# Patient Record
Sex: FEMALE | Race: WHITE | NOT HISPANIC OR LATINO | Employment: OTHER | URBAN - METROPOLITAN AREA
[De-identification: names, ages, dates, MRNs, and addresses within clinical notes are randomized per-mention and may not be internally consistent; named-entity substitution may affect disease eponyms.]

---

## 2023-04-24 ENCOUNTER — OFFICE VISIT (OUTPATIENT)
Dept: PRIMARY CARE | Facility: CLINIC | Age: 63
End: 2023-04-24
Payer: COMMERCIAL

## 2023-04-24 VITALS
HEART RATE: 89 BPM | HEIGHT: 65 IN | WEIGHT: 141.4 LBS | BODY MASS INDEX: 23.56 KG/M2 | DIASTOLIC BLOOD PRESSURE: 70 MMHG | SYSTOLIC BLOOD PRESSURE: 108 MMHG | OXYGEN SATURATION: 97 % | RESPIRATION RATE: 18 BRPM

## 2023-04-24 DIAGNOSIS — L40.9 PSORIASIS: Primary | ICD-10-CM

## 2023-04-24 DIAGNOSIS — G56.03 BILATERAL CARPAL TUNNEL SYNDROME: ICD-10-CM

## 2023-04-24 PROBLEM — M17.0 ARTHRITIS OF BOTH KNEES: Status: RESOLVED | Noted: 2023-04-24 | Resolved: 2023-04-24

## 2023-04-24 PROBLEM — R32 URINARY INCONTINENCE: Status: RESOLVED | Noted: 2023-04-24 | Resolved: 2023-04-24

## 2023-04-24 PROBLEM — R14.0 ABDOMINAL BLOATING: Status: RESOLVED | Noted: 2023-04-24 | Resolved: 2023-04-24

## 2023-04-24 PROBLEM — M25.562 BILATERAL KNEE PAIN: Status: RESOLVED | Noted: 2023-04-24 | Resolved: 2023-04-24

## 2023-04-24 PROBLEM — M25.561 BILATERAL KNEE PAIN: Status: RESOLVED | Noted: 2023-04-24 | Resolved: 2023-04-24

## 2023-04-24 PROBLEM — K21.9 GERD (GASTROESOPHAGEAL REFLUX DISEASE): Status: ACTIVE | Noted: 2023-04-24

## 2023-04-24 PROBLEM — R49.0 HOARSENESS OF VOICE: Status: RESOLVED | Noted: 2023-04-24 | Resolved: 2023-04-24

## 2023-04-24 PROBLEM — Z98.84 BARIATRIC SURGERY STATUS: Status: ACTIVE | Noted: 2023-04-24

## 2023-04-24 PROBLEM — S54.10XA: Status: RESOLVED | Noted: 2023-04-24 | Resolved: 2023-04-24

## 2023-04-24 PROBLEM — E53.8 VITAMIN B12 DEFICIENCY: Status: ACTIVE | Noted: 2023-04-24

## 2023-04-24 PROBLEM — N20.0 NEPHROLITHIASIS: Status: RESOLVED | Noted: 2023-04-24 | Resolved: 2023-04-24

## 2023-04-24 PROBLEM — K22.70 BARRETT'S ESOPHAGUS: Status: ACTIVE | Noted: 2023-04-24

## 2023-04-24 PROBLEM — F41.9 ANXIETY: Status: ACTIVE | Noted: 2023-04-24

## 2023-04-24 PROBLEM — G56.00 CARPAL TUNNEL SYNDROME: Status: ACTIVE | Noted: 2023-04-10

## 2023-04-24 PROBLEM — R10.9 ABDOMINAL PAIN: Status: RESOLVED | Noted: 2023-04-24 | Resolved: 2023-04-24

## 2023-04-24 PROBLEM — J20.9 ACUTE BRONCHITIS: Status: RESOLVED | Noted: 2023-04-24 | Resolved: 2023-04-24

## 2023-04-24 PROBLEM — G43.909 MIGRAINE HEADACHE: Status: ACTIVE | Noted: 2023-04-24

## 2023-04-24 PROBLEM — G47.00 INSOMNIA: Status: ACTIVE | Noted: 2023-04-24

## 2023-04-24 PROBLEM — F32.A DEPRESSION: Status: ACTIVE | Noted: 2023-04-24

## 2023-04-24 PROBLEM — N32.81 OAB (OVERACTIVE BLADDER): Status: ACTIVE | Noted: 2023-04-24

## 2023-04-24 PROBLEM — G25.81 RESTLESS LEGS SYNDROME: Status: ACTIVE | Noted: 2023-04-24

## 2023-04-24 PROBLEM — E56.9 MULTIPLE VITAMIN DEFICIENCY: Status: RESOLVED | Noted: 2023-04-24 | Resolved: 2023-04-24

## 2023-04-24 PROCEDURE — 1036F TOBACCO NON-USER: CPT | Performed by: NURSE PRACTITIONER

## 2023-04-24 PROCEDURE — 99213 OFFICE O/P EST LOW 20 MIN: CPT | Performed by: NURSE PRACTITIONER

## 2023-04-24 RX ORDER — PANTOPRAZOLE SODIUM 40 MG/1
40 TABLET, DELAYED RELEASE ORAL DAILY
COMMUNITY
End: 2023-08-22 | Stop reason: SDUPTHER

## 2023-04-24 RX ORDER — SIMETHICONE 125 MG
TABLET,CHEWABLE ORAL
COMMUNITY

## 2023-04-24 RX ORDER — OXYBUTYNIN CHLORIDE 10 MG/1
1 TABLET, EXTENDED RELEASE ORAL DAILY
COMMUNITY
End: 2023-06-28 | Stop reason: SDUPTHER

## 2023-04-24 RX ORDER — TRAZODONE HYDROCHLORIDE 50 MG/1
50 TABLET ORAL NIGHTLY
COMMUNITY
End: 2024-03-19 | Stop reason: ALTCHOICE

## 2023-04-24 RX ORDER — METHYLPREDNISOLONE 4 MG/1
TABLET ORAL
Qty: 21 TABLET | Refills: 0 | Status: SHIPPED | OUTPATIENT
Start: 2023-04-24 | End: 2023-05-01

## 2023-04-24 RX ORDER — ALBUTEROL SULFATE 90 UG/1
2 AEROSOL, METERED RESPIRATORY (INHALATION) AS NEEDED
COMMUNITY
Start: 2022-06-02

## 2023-04-24 NOTE — PROGRESS NOTES
"Subjective   Patient ID: Lauren Cornell is a 62 y.o. female who presents for Follow-up (Patient in today with concerns of right hand pain starting on 4/10/2023. States she has used OTC meds (with no relief) and a brace which did help some. Patient also stated she is having a Psoriasis flare up. ).    Right wrist pain started 4/10/23 - woke her up out of her sleep.  Hand was numb and tingling and worsened through out the night.   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  She is right hand dominant.    She has tried a brace to wrist and has helped.  She did try OTC medication but did not help.     She has had psoriasis flare up to her right leg and right side of abdomen she has tried lotion but that is not helping.  She denies changes in lotions or creams         Review of Systems   All other systems reviewed and are negative.      Objective   /70   Pulse 89   Resp 18   Ht 1.651 m (5' 5\")   Wt 64.1 kg (141 lb 6.4 oz)   SpO2 97%   BMI 23.53 kg/m²     Physical Exam  Vitals and nursing note reviewed.   Constitutional:       Appearance: Normal appearance. She is normal weight.   HENT:      Head: Normocephalic and atraumatic.      Right Ear: External ear normal.      Left Ear: External ear normal.      Nose: Nose normal.      Mouth/Throat:      Mouth: Mucous membranes are moist.   Cardiovascular:      Rate and Rhythm: Normal rate and regular rhythm.      Pulses: Normal pulses.      Heart sounds: Normal heart sounds.   Pulmonary:      Effort: Pulmonary effort is normal.      Breath sounds: Normal breath sounds.   Musculoskeletal:      Cervical back: Normal range of motion.      Right lower leg: No edema.      Left lower leg: No edema.      Comments: Positive Tinel test bilateral wrists   Skin:     General: Skin is warm.      Comments: Psoriasis noted to right side abdomen and bilateral Calves   Neurological:      Mental Status: She is alert and oriented to person, place, and time. Mental " status is at baseline.   Psychiatric:         Mood and Affect: Mood normal.         Behavior: Behavior normal.         Thought Content: Thought content normal.         Judgment: Judgment normal.         Assessment/Plan   Problem List Items Addressed This Visit          Nervous    Carpal tunnel syndrome     Bilateral positive Tinel test  Right greater than left  She currently does not want referral  Discussed wrist braces at bedtime  She will follow-up if the pain worsens            Immune    Psoriasis - Primary     Current flareup x1 month  Medrol Dosepak

## 2023-04-24 NOTE — PATIENT INSTRUCTIONS
Take the steroids for your psoriasis - if this does not help may need to see a dermatologist    Follow up with GI to discuss your excess gas to see if he has any other suggestion    Wear your wrist brace at bedtime - if the pain does not improve or comes back will have you follow up with orthopedic hand surgeon

## 2023-05-05 DIAGNOSIS — G47.00 INSOMNIA, UNSPECIFIED: ICD-10-CM

## 2023-05-08 RX ORDER — TRAZODONE HYDROCHLORIDE 100 MG/1
TABLET ORAL
Qty: 180 TABLET | Refills: 1 | Status: SHIPPED | OUTPATIENT
Start: 2023-05-08 | End: 2023-08-22 | Stop reason: ALTCHOICE

## 2023-05-14 PROBLEM — L40.9 PSORIASIS: Status: ACTIVE | Noted: 2023-04-24

## 2023-05-14 NOTE — ASSESSMENT & PLAN NOTE
Bilateral positive Tinel test  Right greater than left  She currently does not want referral  Discussed wrist braces at bedtime  She will follow-up if the pain worsens

## 2023-06-28 DIAGNOSIS — N32.81 OVERACTIVE BLADDER: ICD-10-CM

## 2023-06-28 RX ORDER — OXYBUTYNIN CHLORIDE 10 MG/1
10 TABLET, EXTENDED RELEASE ORAL DAILY
Qty: 30 TABLET | Refills: 0 | Status: SHIPPED | OUTPATIENT
Start: 2023-06-28 | End: 2023-07-24

## 2023-07-20 DIAGNOSIS — N32.81 OVERACTIVE BLADDER: ICD-10-CM

## 2023-07-24 RX ORDER — OXYBUTYNIN CHLORIDE 10 MG/1
10 TABLET, EXTENDED RELEASE ORAL DAILY
Qty: 30 TABLET | Refills: 0 | Status: SHIPPED | OUTPATIENT
Start: 2023-07-24 | End: 2023-08-21

## 2023-08-18 DIAGNOSIS — N32.81 OVERACTIVE BLADDER: ICD-10-CM

## 2023-08-21 RX ORDER — OXYBUTYNIN CHLORIDE 10 MG/1
10 TABLET, EXTENDED RELEASE ORAL DAILY
Qty: 30 TABLET | Refills: 0 | Status: SHIPPED | OUTPATIENT
Start: 2023-08-21 | End: 2023-08-22 | Stop reason: SDUPTHER

## 2023-08-22 ENCOUNTER — OFFICE VISIT (OUTPATIENT)
Dept: PRIMARY CARE | Facility: CLINIC | Age: 63
End: 2023-08-22
Payer: COMMERCIAL

## 2023-08-22 VITALS
DIASTOLIC BLOOD PRESSURE: 60 MMHG | BODY MASS INDEX: 23.96 KG/M2 | WEIGHT: 143.8 LBS | OXYGEN SATURATION: 98 % | HEIGHT: 65 IN | HEART RATE: 91 BPM | SYSTOLIC BLOOD PRESSURE: 102 MMHG | RESPIRATION RATE: 18 BRPM

## 2023-08-22 DIAGNOSIS — K22.719 BARRETT'S ESOPHAGUS WITH DYSPLASIA: Primary | ICD-10-CM

## 2023-08-22 DIAGNOSIS — N32.81 OVERACTIVE BLADDER: ICD-10-CM

## 2023-08-22 DIAGNOSIS — N39.46 MIXED STRESS AND URGE URINARY INCONTINENCE: ICD-10-CM

## 2023-08-22 DIAGNOSIS — N32.81 OAB (OVERACTIVE BLADDER): ICD-10-CM

## 2023-08-22 DIAGNOSIS — K21.9 GASTROESOPHAGEAL REFLUX DISEASE, UNSPECIFIED WHETHER ESOPHAGITIS PRESENT: ICD-10-CM

## 2023-08-22 PROBLEM — K29.60 REFLUX GASTRITIS: Status: RESOLVED | Noted: 2023-08-22 | Resolved: 2023-08-22

## 2023-08-22 PROBLEM — G47.33 OBSTRUCTIVE SLEEP APNEA SYNDROME: Status: RESOLVED | Noted: 2023-08-22 | Resolved: 2023-08-22

## 2023-08-22 PROBLEM — K29.60 REFLUX GASTRITIS: Status: ACTIVE | Noted: 2023-08-22

## 2023-08-22 PROBLEM — T42.4X5D: Status: RESOLVED | Noted: 2023-08-22 | Resolved: 2023-08-22

## 2023-08-22 PROBLEM — M79.10 MUSCLE PAIN: Status: RESOLVED | Noted: 2023-08-22 | Resolved: 2023-08-22

## 2023-08-22 PROBLEM — M79.672 PAIN OF LEFT HEEL: Status: RESOLVED | Noted: 2023-08-22 | Resolved: 2023-08-22

## 2023-08-22 PROBLEM — F41.9 ANXIETY: Status: RESOLVED | Noted: 2023-04-24 | Resolved: 2023-08-22

## 2023-08-22 PROBLEM — F32.A DEPRESSION: Status: RESOLVED | Noted: 2023-04-24 | Resolved: 2023-08-22

## 2023-08-22 PROBLEM — Z98.84 BARIATRIC SURGERY STATUS: Status: RESOLVED | Noted: 2023-04-24 | Resolved: 2023-08-22

## 2023-08-22 PROCEDURE — 1036F TOBACCO NON-USER: CPT | Performed by: NURSE PRACTITIONER

## 2023-08-22 PROCEDURE — 99213 OFFICE O/P EST LOW 20 MIN: CPT | Performed by: NURSE PRACTITIONER

## 2023-08-22 RX ORDER — PANTOPRAZOLE SODIUM 40 MG/1
40 TABLET, DELAYED RELEASE ORAL DAILY
Qty: 90 TABLET | Refills: 1 | Status: SHIPPED | OUTPATIENT
Start: 2023-08-22 | End: 2024-03-07

## 2023-08-22 RX ORDER — OXYBUTYNIN CHLORIDE 10 MG/1
10 TABLET, EXTENDED RELEASE ORAL DAILY
Qty: 90 TABLET | Refills: 1 | Status: SHIPPED | OUTPATIENT
Start: 2023-08-22 | End: 2024-03-18

## 2023-08-22 NOTE — PROGRESS NOTES
"Subjective   Patient ID: Lauren Cornell is a 62 y.o. female who presents for Follow-up (Patient in today for routine F/U and medication refills. ).    Presents to follow-up for GERD and Reyes's esophagus.  She does need refill pantoprazole.  She has continued to keep her weight off post gastric sleeve.  She had gastric sleeve 11 years ago.  She will take the trazodone as needed for insomnia she states that she takes at last 3 times a week  Also presents for refill of oxybutynin  She does take it for hyperactive bladder.  She states the oxybutynin does help with her urinary incontinence    She denies complaints today         Review of Systems   All other systems reviewed and are negative.      Objective   /60   Pulse 91   Resp 18   Ht 1.651 m (5' 5\")   Wt 65.2 kg (143 lb 12.8 oz)   SpO2 98%   BMI 23.93 kg/m²     Physical Exam  Vitals and nursing note reviewed.   Constitutional:       General: She is not in acute distress.     Appearance: Normal appearance. She is normal weight.   HENT:      Head: Normocephalic and atraumatic.      Right Ear: External ear normal.      Left Ear: External ear normal.      Nose: Nose normal.      Mouth/Throat:      Mouth: Mucous membranes are moist.   Eyes:      Extraocular Movements: Extraocular movements intact.      Conjunctiva/sclera: Conjunctivae normal.      Pupils: Pupils are equal, round, and reactive to light.   Cardiovascular:      Rate and Rhythm: Normal rate and regular rhythm.      Pulses: Normal pulses.      Heart sounds: Normal heart sounds.   Pulmonary:      Effort: Pulmonary effort is normal.      Breath sounds: Normal breath sounds.   Musculoskeletal:      Cervical back: Normal range of motion.   Skin:     General: Skin is warm.   Neurological:      Mental Status: She is alert and oriented to person, place, and time. Mental status is at baseline.   Psychiatric:         Mood and Affect: Mood normal.         Behavior: Behavior normal.         Thought " Content: Thought content normal.         Judgment: Judgment normal.         Assessment/Plan   Problem List Items Addressed This Visit       Reyes's esophagus - Primary     Stable  History of gastric sleeve approximately 11 years ago  Continue pantoprazole         GERD (gastroesophageal reflux disease)     Stable  History of gastric sleeve approximately 11 years ago  Continue pantoprazole         Relevant Medications    pantoprazole (ProtoNix) 40 mg EC tablet    OAB (overactive bladder)     Stable  Continue oxybutynin  Follow-up in 1 year         Mixed stress and urge urinary incontinence     Stable  Continue oxybutynin  Follow-up in 1 year          Other Visit Diagnoses       Overactive bladder        Relevant Medications    oxybutynin XL (Ditropan-XL) 10 mg 24 hr tablet

## 2023-08-31 ENCOUNTER — DOCUMENTATION (OUTPATIENT)
Dept: PRIMARY CARE | Facility: CLINIC | Age: 63
End: 2023-08-31
Payer: COMMERCIAL

## 2023-08-31 DIAGNOSIS — K21.9 GASTROESOPHAGEAL REFLUX DISEASE WITHOUT ESOPHAGITIS: Primary | ICD-10-CM

## 2023-10-05 ENCOUNTER — APPOINTMENT (OUTPATIENT)
Dept: ORTHOPEDIC SURGERY | Facility: CLINIC | Age: 63
End: 2023-10-05
Payer: COMMERCIAL

## 2023-10-11 ENCOUNTER — OFFICE VISIT (OUTPATIENT)
Dept: ORTHOPEDIC SURGERY | Facility: CLINIC | Age: 63
End: 2023-10-11
Payer: COMMERCIAL

## 2023-10-11 DIAGNOSIS — M17.11 PATELLOFEMORAL ARTHRITIS OF RIGHT KNEE: Primary | ICD-10-CM

## 2023-10-11 DIAGNOSIS — M25.561 RIGHT KNEE PAIN, UNSPECIFIED CHRONICITY: ICD-10-CM

## 2023-10-11 PROCEDURE — 99214 OFFICE O/P EST MOD 30 MIN: CPT | Performed by: ORTHOPAEDIC SURGERY

## 2023-10-11 PROCEDURE — 1036F TOBACCO NON-USER: CPT | Performed by: ORTHOPAEDIC SURGERY

## 2023-10-12 NOTE — PROGRESS NOTES
Right knee pain    62-year-old female presents to see me in regards to right knee pain.  Patient is at 5 years of pain primarily over the anterior aspect of the knee.  She states that stairs are the worst making the pain worse.  She has had extensive conservative treatment with anti-inflammatories physical therapy steroid injections and viscosupplementation injections with only limited improvement.  She is here to review the results of an MRI.      Patients' self reported past medical history, medications, allergies, surgical history, family and social history as well as a 10 point review of systems has been documented in the new patient intake form and scanned into the patient's electronic medical record.  The intake form was reviewed by Dr Singh during the office visit and signed by Dr. Singh and the patient.  Pertinent findings are documented in the HPI.    General Multi-System Physical Exam:  Constitutional  General appearance:  Alert, oriented, and in no acute distress.  Well developed, well nourished.  Head and Face  Head and face:  Normocephalic and atraumatic.  Ears, Nose, Mouth, and Throat  External inspection of ears and nose: Normal.  Eyes:  Pupils are equal and round.  Neck  Neck:  no neck mass was observed.  Pulmonary  Respiratory effort:  no respiratory distress.  Cardiovascular  Intact distal pulses.  Lymphatic  Palpation of lymph nodes in the affected extremity:  Normal.  Skin  Skin and subcutaneous tissue:  Normal skin color and pigmentation.  Normal skin turgor.  No rashes.  Neurologic  Reflexes:  deep tendon reflexes were 2+ and symmetric.  Sensation:  normal to light touch.  Psychiatric  Judgement and insight:  Intact.  Mood and affect:  Normal.  Musculoskeletal  Right knee has full range of motion with a minimal effusion positive patellar grind.  Positive medial joint line tenderness negative lateral joint line tenderness.  Patient has a negative Lockman exam, negative anterior and negative  posterior drawer. The knee is stable to varus and valgus stress without pain. Patient is neurovascularly intact in the bilateral lower extremities.      Dr Singh personally reviewed the results of the x-rays that had been performed recently on this patient.    In addition, Dr Singh independently interpreted the patient's x-rays (performed by the Radiology department) by viewing the x-ray images and this is Dr. Singh's personal interpretation:     Some patellofemoral cartilage wear but no other acute findings noted    Dr Singh independently interpreted the patient's MRI (performed by the Radiology department) by viewing the MRI images and this is Dr. Singh's personal interpretation:     Severe patellofemoral cartilage damage with no medial or lateral meniscus tearing    We had a very long discussion regards to her right knee.  We talked about the fact that the pain is coming from the cartilage damage underneath her patellofemoral joint.  There is really no significant arthroscopic treatment that could help this.  We talked about the fact that this is arthritis.  Unfortunately, there is very little I can do to help this patient.  I will refer her back to Dr. Birmingham for further treatment of injections versus possible partial or total knee replacement.        This patient has had longstanding pain and weakness in their affected extremity which has gotten worse.  Non-operative treatment has failed to help this patient and the pain is worsening.  That would classify this problem as a chronic illness with exacerbation and progression.    Due to this patient's condition, they are at a moderate risk of morbidity from additional diagnostic testing / treatment.

## 2023-12-28 ENCOUNTER — LAB (OUTPATIENT)
Dept: LAB | Facility: LAB | Age: 63
End: 2023-12-28
Payer: COMMERCIAL

## 2023-12-28 DIAGNOSIS — K21.9 GASTROESOPHAGEAL REFLUX DISEASE WITHOUT ESOPHAGITIS: ICD-10-CM

## 2023-12-28 PROCEDURE — 36415 COLL VENOUS BLD VENIPUNCTURE: CPT

## 2023-12-28 PROCEDURE — 82306 VITAMIN D 25 HYDROXY: CPT

## 2023-12-28 PROCEDURE — 82607 VITAMIN B-12: CPT

## 2023-12-29 LAB
25(OH)D3 SERPL-MCNC: 33 NG/ML (ref 30–100)
VIT B12 SERPL-MCNC: 264 PG/ML (ref 211–911)

## 2024-02-19 DIAGNOSIS — Z80.3 FAMILY HISTORY OF BREAST CANCER IN SISTER: Primary | ICD-10-CM

## 2024-02-19 NOTE — PROGRESS NOTES
Would like referral to genetics to be tested again for BRCA. Was tested in 2010 and negative but has had 2 sisters pass from breast cancer.  Referral placed

## 2024-03-07 DIAGNOSIS — K21.9 GASTROESOPHAGEAL REFLUX DISEASE, UNSPECIFIED WHETHER ESOPHAGITIS PRESENT: ICD-10-CM

## 2024-03-07 RX ORDER — PANTOPRAZOLE SODIUM 40 MG/1
40 TABLET, DELAYED RELEASE ORAL DAILY
Qty: 90 TABLET | Refills: 1 | Status: SHIPPED | OUTPATIENT
Start: 2024-03-07

## 2024-03-18 DIAGNOSIS — N32.81 OVERACTIVE BLADDER: ICD-10-CM

## 2024-03-18 RX ORDER — OXYBUTYNIN CHLORIDE 10 MG/1
10 TABLET, EXTENDED RELEASE ORAL DAILY
Qty: 90 TABLET | Refills: 1 | Status: SHIPPED | OUTPATIENT
Start: 2024-03-18 | End: 2024-06-06 | Stop reason: SDUPTHER

## 2024-03-19 ENCOUNTER — LAB (OUTPATIENT)
Dept: LAB | Facility: LAB | Age: 64
End: 2024-03-19
Payer: COMMERCIAL

## 2024-03-19 ENCOUNTER — OFFICE VISIT (OUTPATIENT)
Dept: PRIMARY CARE | Facility: CLINIC | Age: 64
End: 2024-03-19
Payer: COMMERCIAL

## 2024-03-19 VITALS
WEIGHT: 142 LBS | OXYGEN SATURATION: 98 % | RESPIRATION RATE: 18 BRPM | HEIGHT: 65 IN | SYSTOLIC BLOOD PRESSURE: 108 MMHG | BODY MASS INDEX: 23.66 KG/M2 | DIASTOLIC BLOOD PRESSURE: 74 MMHG | HEART RATE: 91 BPM

## 2024-03-19 DIAGNOSIS — Z13.6 SCREENING FOR CARDIOVASCULAR CONDITION: ICD-10-CM

## 2024-03-19 DIAGNOSIS — G47.09 OTHER INSOMNIA: Primary | ICD-10-CM

## 2024-03-19 DIAGNOSIS — G56.03 BILATERAL CARPAL TUNNEL SYNDROME: ICD-10-CM

## 2024-03-19 DIAGNOSIS — Z12.4 CERVICAL CANCER SCREENING: ICD-10-CM

## 2024-03-19 DIAGNOSIS — Z00.00 HEALTH CARE MAINTENANCE: ICD-10-CM

## 2024-03-19 DIAGNOSIS — Z12.31 SCREENING MAMMOGRAM, ENCOUNTER FOR: ICD-10-CM

## 2024-03-19 DIAGNOSIS — E89.40 ASYMPTOMATIC POSTSURGICAL MENOPAUSE: ICD-10-CM

## 2024-03-19 LAB
ALBUMIN SERPL BCP-MCNC: 4.2 G/DL (ref 3.4–5)
ALP SERPL-CCNC: 70 U/L (ref 33–136)
ALT SERPL W P-5'-P-CCNC: 10 U/L (ref 7–45)
ANION GAP SERPL CALC-SCNC: 12 MMOL/L (ref 10–20)
AST SERPL W P-5'-P-CCNC: 12 U/L (ref 9–39)
BILIRUB SERPL-MCNC: 0.9 MG/DL (ref 0–1.2)
BUN SERPL-MCNC: 23 MG/DL (ref 6–23)
CALCIUM SERPL-MCNC: 9.2 MG/DL (ref 8.6–10.3)
CHLORIDE SERPL-SCNC: 102 MMOL/L (ref 98–107)
CHOLEST SERPL-MCNC: 186 MG/DL (ref 0–199)
CHOLESTEROL/HDL RATIO: 3.3
CO2 SERPL-SCNC: 30 MMOL/L (ref 21–32)
CREAT SERPL-MCNC: 0.76 MG/DL (ref 0.5–1.05)
EGFRCR SERPLBLD CKD-EPI 2021: 88 ML/MIN/1.73M*2
ERYTHROCYTE [DISTWIDTH] IN BLOOD BY AUTOMATED COUNT: 13 % (ref 11.5–14.5)
GLUCOSE SERPL-MCNC: 98 MG/DL (ref 74–99)
HCT VFR BLD AUTO: 46.4 % (ref 36–46)
HDLC SERPL-MCNC: 56.1 MG/DL
HGB BLD-MCNC: 14.3 G/DL (ref 12–16)
LDLC SERPL CALC-MCNC: 114 MG/DL
MCH RBC QN AUTO: 27.3 PG (ref 26–34)
MCHC RBC AUTO-ENTMCNC: 30.8 G/DL (ref 32–36)
MCV RBC AUTO: 89 FL (ref 80–100)
NON HDL CHOLESTEROL: 130 MG/DL (ref 0–149)
NRBC BLD-RTO: 0 /100 WBCS (ref 0–0)
PLATELET # BLD AUTO: 280 X10*3/UL (ref 150–450)
POTASSIUM SERPL-SCNC: 3.9 MMOL/L (ref 3.5–5.3)
PROT SERPL-MCNC: 6.7 G/DL (ref 6.4–8.2)
RBC # BLD AUTO: 5.23 X10*6/UL (ref 4–5.2)
SODIUM SERPL-SCNC: 140 MMOL/L (ref 136–145)
TRIGL SERPL-MCNC: 79 MG/DL (ref 0–149)
TSH SERPL-ACNC: 1.42 MIU/L (ref 0.44–3.98)
VLDL: 16 MG/DL (ref 0–40)
WBC # BLD AUTO: 4.7 X10*3/UL (ref 4.4–11.3)

## 2024-03-19 PROCEDURE — 88142 CYTOPATH C/V THIN LAYER: CPT

## 2024-03-19 PROCEDURE — 99396 PREV VISIT EST AGE 40-64: CPT | Performed by: NURSE PRACTITIONER

## 2024-03-19 PROCEDURE — 36415 COLL VENOUS BLD VENIPUNCTURE: CPT

## 2024-03-19 PROCEDURE — 88141 CYTOPATH C/V INTERPRET: CPT | Performed by: PATHOLOGY

## 2024-03-19 PROCEDURE — 84443 ASSAY THYROID STIM HORMONE: CPT

## 2024-03-19 PROCEDURE — 87624 HPV HI-RISK TYP POOLED RSLT: CPT

## 2024-03-19 PROCEDURE — 1036F TOBACCO NON-USER: CPT | Performed by: NURSE PRACTITIONER

## 2024-03-19 PROCEDURE — 85027 COMPLETE CBC AUTOMATED: CPT

## 2024-03-19 PROCEDURE — 80061 LIPID PANEL: CPT

## 2024-03-19 PROCEDURE — 80053 COMPREHEN METABOLIC PANEL: CPT

## 2024-03-19 RX ORDER — HYDROXYZINE HYDROCHLORIDE 10 MG/1
10 TABLET, FILM COATED ORAL NIGHTLY PRN
Qty: 30 TABLET | Refills: 3 | Status: SHIPPED | OUTPATIENT
Start: 2024-03-19 | End: 2024-06-06 | Stop reason: SDUPTHER

## 2024-03-19 NOTE — ASSESSMENT & PLAN NOTE
Trazodone makes her feel groggy in the morning  Stop trazodone  Start hydroxyzine 10 mg at bedtime

## 2024-03-19 NOTE — ASSESSMENT & PLAN NOTE
- CBC, CMP, Lipid  -  up to date with immunizations  -  Pap today  -  Mammogram ordered  -  screening colonoscopy 2022  -  eat a diet high in lean protein, vegetable, fruits, and low in carbohydrates  -  exercise 20-30 minutes 4-5 days a week  -  Follow up in 1 year or sooner if needed   - dexa ordered due to hx of gastric sleeve 13 years ago

## 2024-03-19 NOTE — PROGRESS NOTES
"Subjective   Patient ID: Lauren Cornell is a 63 y.o. female who presents for Annual Exam (Lauren is in today for annual CPE. She would like to discuss left hand pain/weakness, weight, and difficulty with sleeping. ).    Well Adult Physical   Patient here for a comprehensive physical exam.      Do you take any herbs or supplements that were not prescribed by a doctor? no   Are you taking calcium supplements? no   Are you taking aspirin daily? no     History:  Had uterine ablation at age 50 and has not period since  Last pap date:   Abnormal pap? no  : 5  Para: 3    Mammogram   Pap 2018  Dexa has not had  Colonoscopy     Still having trouble falling and staying asleep. Feels groggy the next day when she takes trazodone  She has had the carpel tunnel again - worse to left side than right.  Has not been wearing wrist braces at night  Working    Does not smoke            Review of Systems   All other systems reviewed and are negative.      Objective   /74   Pulse 91   Resp 18   Ht 1.651 m (5' 5\")   Wt 64.4 kg (142 lb)   SpO2 98%   BMI 23.63 kg/m²     Physical Exam  Vitals and nursing note reviewed.   Constitutional:       General: She is not in acute distress.     Appearance: Normal appearance. She is normal weight.   HENT:      Head: Normocephalic and atraumatic.      Right Ear: Tympanic membrane, ear canal and external ear normal.      Left Ear: Tympanic membrane, ear canal and external ear normal.      Nose: Nose normal.      Mouth/Throat:      Mouth: Mucous membranes are moist.      Pharynx: Oropharynx is clear.   Eyes:      Extraocular Movements: Extraocular movements intact.      Conjunctiva/sclera: Conjunctivae normal.      Pupils: Pupils are equal, round, and reactive to light.   Neck:      Vascular: No carotid bruit.   Cardiovascular:      Rate and Rhythm: Normal rate and regular rhythm.      Pulses: Normal pulses.      Heart sounds: Normal heart sounds.   Pulmonary: "      Effort: Pulmonary effort is normal.      Breath sounds: Normal breath sounds.   Abdominal:      General: Bowel sounds are normal. There is no distension.      Palpations: Abdomen is soft.      Tenderness: There is no abdominal tenderness.   Genitourinary:     General: Normal vulva.      Vagina: No vaginal discharge.   Musculoskeletal:         General: Normal range of motion.      Cervical back: Normal range of motion and neck supple.      Right lower leg: No edema.      Left lower leg: No edema.   Lymphadenopathy:      Cervical: No cervical adenopathy.   Skin:     General: Skin is warm and dry.      Capillary Refill: Capillary refill takes less than 2 seconds.   Neurological:      General: No focal deficit present.      Mental Status: She is alert and oriented to person, place, and time. Mental status is at baseline.      Motor: No weakness.   Psychiatric:         Mood and Affect: Mood normal.         Behavior: Behavior normal.         Thought Content: Thought content normal.         Judgment: Judgment normal.         Assessment/Plan   Problem List Items Addressed This Visit             ICD-10-CM    Carpal tunnel syndrome G56.00     Wear the brace at bedtime to see if helps  If not let me know and will try steroids  And if that doesn't help will send you to orthopedics         Insomnia - Primary G47.00     Trazodone makes her feel groggy in the morning  Stop trazodone  Start hydroxyzine 10 mg at bedtime         Relevant Medications    hydrOXYzine HCL (Atarax) 10 mg tablet    Health care maintenance Z00.00     - CBC, CMP, Lipid  -  up to date with immunizations  -  Pap today  -  Mammogram ordered  -  screening colonoscopy 2022  -  eat a diet high in lean protein, vegetable, fruits, and low in carbohydrates  -  exercise 20-30 minutes 4-5 days a week  -  Follow up in 1 year or sooner if needed   - dexa ordered due to hx of gastric sleeve 13 years ago         Relevant Orders    CBC    Comprehensive Metabolic Panel     TSH with reflex to Free T4 if abnormal     Other Visit Diagnoses         Codes    Screening for cardiovascular condition     Z13.6    Relevant Orders    Lipid Panel    Screening mammogram, encounter for     Z12.31    Relevant Orders    BI mammo bilateral screening tomosynthesis    Cervical cancer screening     Z12.4    Relevant Orders    THINPREP PAP TEST    Asymptomatic postsurgical menopause     E89.40    Relevant Orders    XR DEXA bone density

## 2024-03-19 NOTE — ASSESSMENT & PLAN NOTE
Wear the brace at bedtime to see if helps  If not let me know and will try steroids  And if that doesn't help will send you to orthopedics

## 2024-03-19 NOTE — PATIENT INSTRUCTIONS
Stop the trazodone  Start the hydroxyzine at bedtime to help with feeling anxious and sleeping  I did your pap today - will let you know the results  I ordered a bone density test  I ordered your mammogram  Your colonoscopy was in 2022

## 2024-03-22 ENCOUNTER — HOSPITAL ENCOUNTER (OUTPATIENT)
Dept: RADIOLOGY | Facility: HOSPITAL | Age: 64
Discharge: HOME | End: 2024-03-22
Payer: COMMERCIAL

## 2024-03-22 DIAGNOSIS — E89.40 ASYMPTOMATIC POSTSURGICAL MENOPAUSE: ICD-10-CM

## 2024-03-25 ENCOUNTER — TELEPHONE (OUTPATIENT)
Dept: PRIMARY CARE | Facility: CLINIC | Age: 64
End: 2024-03-25
Payer: COMMERCIAL

## 2024-03-26 ENCOUNTER — HOSPITAL ENCOUNTER (OUTPATIENT)
Dept: RADIOLOGY | Facility: HOSPITAL | Age: 64
Discharge: HOME | End: 2024-03-26
Payer: COMMERCIAL

## 2024-03-26 VITALS — WEIGHT: 143 LBS | HEIGHT: 65 IN | BODY MASS INDEX: 23.82 KG/M2

## 2024-03-26 DIAGNOSIS — Z12.31 SCREENING MAMMOGRAM, ENCOUNTER FOR: ICD-10-CM

## 2024-03-26 PROCEDURE — 77067 SCR MAMMO BI INCL CAD: CPT | Performed by: RADIOLOGY

## 2024-03-26 PROCEDURE — 77063 BREAST TOMOSYNTHESIS BI: CPT | Performed by: RADIOLOGY

## 2024-03-26 PROCEDURE — 77067 SCR MAMMO BI INCL CAD: CPT

## 2024-03-26 NOTE — TELEPHONE ENCOUNTER
Called patient no answer left a detailed VM with this information. Patient to CB with questions or concerns.

## 2024-03-28 ENCOUNTER — HOSPITAL ENCOUNTER (OUTPATIENT)
Dept: RADIOLOGY | Facility: HOSPITAL | Age: 64
Discharge: HOME | End: 2024-03-28
Payer: COMMERCIAL

## 2024-03-28 PROCEDURE — 77080 DXA BONE DENSITY AXIAL: CPT | Performed by: RADIOLOGY

## 2024-03-28 PROCEDURE — 77080 DXA BONE DENSITY AXIAL: CPT

## 2024-03-29 LAB
CYTOLOGY CMNT CVX/VAG CYTO-IMP: NORMAL
HPV HR 12 DNA GENITAL QL NAA+PROBE: POSITIVE
HPV HR GENOTYPES PNL CVX NAA+PROBE: POSITIVE
HPV16 DNA SPEC QL NAA+PROBE: POSITIVE
HPV18 DNA SPEC QL NAA+PROBE: NEGATIVE
LAB AP HPV GENOTYPE QUESTION: YES
LAB AP HPV HR: NORMAL
LABORATORY COMMENT REPORT: NORMAL
LABORATORY COMMENT REPORT: NORMAL
PATH REPORT.TOTAL CANCER: NORMAL

## 2024-04-01 DIAGNOSIS — R87.618 ABNORMAL PAPANICOLAOU SMEAR OF CERVIX WITH POSITIVE HUMAN PAPILLOMA VIRUS (HPV) TEST: Primary | ICD-10-CM

## 2024-04-08 ENCOUNTER — APPOINTMENT (OUTPATIENT)
Dept: PRIMARY CARE | Facility: CLINIC | Age: 64
End: 2024-04-08
Payer: COMMERCIAL

## 2024-04-24 PROCEDURE — 88305 TISSUE EXAM BY PATHOLOGIST: CPT | Performed by: PATHOLOGY

## 2024-04-24 PROCEDURE — 88305 TISSUE EXAM BY PATHOLOGIST: CPT

## 2024-04-25 ENCOUNTER — LAB REQUISITION (OUTPATIENT)
Dept: LAB | Facility: HOSPITAL | Age: 64
End: 2024-04-25
Payer: COMMERCIAL

## 2024-04-25 DIAGNOSIS — R87.613 HIGH GRADE SQUAMOUS INTRAEPITHELIAL LESION ON CYTOLOGIC SMEAR OF CERVIX (HGSIL): ICD-10-CM

## 2024-04-29 LAB
LABORATORY COMMENT REPORT: NORMAL
PATH REPORT.FINAL DX SPEC: NORMAL
PATH REPORT.GROSS SPEC: NORMAL
PATH REPORT.RELEVANT HX SPEC: NORMAL
PATH REPORT.TOTAL CANCER: NORMAL

## 2024-05-29 ENCOUNTER — APPOINTMENT (OUTPATIENT)
Dept: GENETICS | Facility: CLINIC | Age: 64
End: 2024-05-29
Payer: COMMERCIAL

## 2024-05-29 PROCEDURE — 88305 TISSUE EXAM BY PATHOLOGIST: CPT

## 2024-05-29 PROCEDURE — 88341 IMHCHEM/IMCYTCHM EA ADD ANTB: CPT

## 2024-05-29 PROCEDURE — 88342 IMHCHEM/IMCYTCHM 1ST ANTB: CPT

## 2024-05-30 ENCOUNTER — LAB REQUISITION (OUTPATIENT)
Dept: LAB | Facility: HOSPITAL | Age: 64
End: 2024-05-30
Payer: COMMERCIAL

## 2024-05-30 DIAGNOSIS — R87.810 CERVICAL HIGH RISK HUMAN PAPILLOMAVIRUS (HPV) DNA TEST POSITIVE: ICD-10-CM

## 2024-05-30 DIAGNOSIS — R87.613 HIGH GRADE SQUAMOUS INTRAEPITHELIAL LESION ON CYTOLOGIC SMEAR OF CERVIX (HGSIL): ICD-10-CM

## 2024-06-06 ENCOUNTER — OFFICE VISIT (OUTPATIENT)
Dept: PRIMARY CARE | Facility: CLINIC | Age: 64
End: 2024-06-06
Payer: COMMERCIAL

## 2024-06-06 VITALS
BODY MASS INDEX: 23.14 KG/M2 | SYSTOLIC BLOOD PRESSURE: 129 MMHG | HEIGHT: 66 IN | WEIGHT: 144 LBS | DIASTOLIC BLOOD PRESSURE: 82 MMHG

## 2024-06-06 DIAGNOSIS — G47.09 OTHER INSOMNIA: ICD-10-CM

## 2024-06-06 DIAGNOSIS — N32.81 OVERACTIVE BLADDER: ICD-10-CM

## 2024-06-06 DIAGNOSIS — N32.81 OAB (OVERACTIVE BLADDER): Primary | ICD-10-CM

## 2024-06-06 PROCEDURE — 99213 OFFICE O/P EST LOW 20 MIN: CPT | Performed by: NURSE PRACTITIONER

## 2024-06-06 PROCEDURE — 1036F TOBACCO NON-USER: CPT | Performed by: NURSE PRACTITIONER

## 2024-06-06 RX ORDER — OXYBUTYNIN CHLORIDE 15 MG/1
15 TABLET, EXTENDED RELEASE ORAL DAILY
Qty: 30 TABLET | Refills: 11 | Status: SHIPPED | OUTPATIENT
Start: 2024-06-06

## 2024-06-06 RX ORDER — HYDROXYZINE HYDROCHLORIDE 25 MG/1
25 TABLET, FILM COATED ORAL NIGHTLY PRN
Qty: 30 TABLET | Refills: 3 | Status: SHIPPED | OUTPATIENT
Start: 2024-06-06 | End: 2024-10-04

## 2024-06-06 ASSESSMENT — PATIENT HEALTH QUESTIONNAIRE - PHQ9
SUM OF ALL RESPONSES TO PHQ9 QUESTIONS 1 AND 2: 0
1. LITTLE INTEREST OR PLEASURE IN DOING THINGS: NOT AT ALL
2. FEELING DOWN, DEPRESSED OR HOPELESS: NOT AT ALL

## 2024-06-06 NOTE — PROGRESS NOTES
"Subjective   Patient ID: Dejah Cornell is a 63 y.o. female who presents for Results (Would like to discuss test results, would like to up the dose on Atarax and Albuterol ).    Presents to follow up for anxiety.  Would like to increase hydroxyzine at night.  Still having trouble falling asleep.  She did try 2 tabs and that did help.  Would like to increase    Would like to try to increase ditropan to see if it will help.  She still has urinary frequency     Did have to have repeat uterine biopsy as first one was not sufficient.  She is waiting for results          Review of Systems   All other systems reviewed and are negative.      Objective   /82   Ht 1.676 m (5' 6\")   Wt 65.3 kg (144 lb)   BMI 23.24 kg/m²     Physical Exam  Vitals and nursing note reviewed.   Constitutional:       General: She is not in acute distress.     Appearance: Normal appearance. She is normal weight.   HENT:      Head: Normocephalic and atraumatic.      Right Ear: External ear normal.      Left Ear: External ear normal.      Nose: Nose normal.      Mouth/Throat:      Mouth: Mucous membranes are moist.      Pharynx: Oropharynx is clear.   Eyes:      Extraocular Movements: Extraocular movements intact.      Conjunctiva/sclera: Conjunctivae normal.      Pupils: Pupils are equal, round, and reactive to light.   Neck:      Vascular: No carotid bruit.   Cardiovascular:      Rate and Rhythm: Normal rate and regular rhythm.      Pulses: Normal pulses.      Heart sounds: Normal heart sounds.   Pulmonary:      Effort: Pulmonary effort is normal.      Breath sounds: Normal breath sounds.   Musculoskeletal:      Cervical back: Normal range of motion and neck supple.   Lymphadenopathy:      Cervical: No cervical adenopathy.   Skin:     General: Skin is warm and dry.      Capillary Refill: Capillary refill takes less than 2 seconds.   Neurological:      Mental Status: She is alert and oriented to person, place, and time.   Psychiatric:    "      Mood and Affect: Mood normal.         Behavior: Behavior normal.         Thought Content: Thought content normal.         Judgment: Judgment normal.         Assessment/Plan   Problem List Items Addressed This Visit             ICD-10-CM    Insomnia G47.00     Improved with 20 mg  Increase hydroxyzine to 25 mg at night         Relevant Medications    hydrOXYzine HCL (Atarax) 25 mg tablet    OAB (overactive bladder) - Primary N32.81     Increase oxybutynin to 15 mg  Follow up in one year          Other Visit Diagnoses         Codes    Overactive bladder     N32.81    Relevant Medications    oxybutynin XL (Ditropan-XL) 15 mg 24 hr tablet               Patient was identified as a fall risk. Risk prevention instructions provided.

## 2024-06-06 NOTE — PATIENT INSTRUCTIONS
I sent in a higher dose of the hydroxyzine to help you sleep  I sent in a higher dose of the ditropan - this is the highest dose of medication        Ways to Help Prevent Falls at Home    Quick Tips   ? Ask for help if you need it. Most people want to help!   ? Get up slowly after sitting or laying down   ? Wear a medical alert device or keep cell phone in your pocket   ? Use night lights, especially areas near a bathroom   ? Keep the items you use often within reach on a small stool or end table   ? Use an assistive device such as walker or cane, as directed by provider/physical therapy   ? Use a non-slip mat and grab bars in your bathroom. Look for home health sections for best options     Other Areas to Focus On   ? Exercise and nutrition: Regular exercise or taking a falls prevention class are great ways improve strength and balance. Don’t forget to stay hydrated and bring a snack!   ? Medicine side effects: Some medicines can make you sleepy or dizzy, which could cause a fall. Ask your healthcare provider about the side effects your medicines could cause. Be sure to let them know if you take any vitamins or supplements as well.   ? Tripping hazards: Remove items you could trip on, such as loose mats, rugs, cords, and clutter. Wear closed toe shoes with rubber soles.   ? Health and wellness: Get regular checkups with your healthcare provider, plus routine vision and hearing screenings. Talk with your healthcare provider about:   o Your medicines and the possible side effects - bring them in a bag if that is easier!   o Problems with balance or feeling dizzy   o Ways to promote bone health, such as Vitamin D and calcium supplements   o Questions or concerns about falling     *Ask your healthcare team if you have questions     ©[employer], 2022

## 2024-06-12 LAB
LAB AP ASR DISCLAIMER: NORMAL
LABORATORY COMMENT REPORT: NORMAL
PATH REPORT.FINAL DX SPEC: NORMAL
PATH REPORT.GROSS SPEC: NORMAL
PATH REPORT.RELEVANT HX SPEC: NORMAL
PATH REPORT.TOTAL CANCER: NORMAL

## 2024-06-18 ENCOUNTER — HOSPITAL ENCOUNTER (OUTPATIENT)
Dept: RADIOLOGY | Facility: EXTERNAL LOCATION | Age: 64
Discharge: HOME | End: 2024-06-18

## 2024-06-18 DIAGNOSIS — R52 PAIN: ICD-10-CM

## 2024-07-10 ENCOUNTER — APPOINTMENT (OUTPATIENT)
Dept: GENETICS | Facility: CLINIC | Age: 64
End: 2024-07-10
Payer: COMMERCIAL

## 2024-07-10 DIAGNOSIS — Z80.3 FAMILY HISTORY OF BREAST CANCER IN SISTER: Primary | ICD-10-CM

## 2024-07-10 PROCEDURE — 99205 OFFICE O/P NEW HI 60 MIN: CPT | Performed by: INTERNAL MEDICINE

## 2024-07-10 NOTE — PROGRESS NOTES
"  MEDICAL GENETICS INITIAL VISIT NOTE      Patient full name: Lauren Cornell  MRN: [id number]  YOB: 1960  Present during this visit: The patient     Primary care and referring provider: IVONNE Dowell    Medical history was obtained from the patient. Prior to this appointment, I reviewed medical records from outpatient medical records and scanned documents, if available. This visit was completed via televideo. All issues as below were discussed and addressed but no physical exam was performed. If it was felt that the patient should be evaluated in clinic then they were directed there. The patient consented to visit.    History of present illness:    Dear IVONNE Dowell:    It was a pleasure to see Ms. Cornell in clinic today. Ms. Cornell is a 63 y.o. female who was referred to the Medical Genetics Clinic at [facility] for evaluation of hereditary predisposition to cancer.     She informed me that she was recently diagnosed with \"cervical cancer\". Chart review showed that recent cervix biopsy showed HSIL. A recent pap smear showed positive HPV. She is scheduled to have hysterectomy on 2024. No other cancer in her lifetime.     In , she had negative genetic testing for \"BRCA\".    PMH - GERD, RLS, migraines, psoriasis    Cancer screening history:  Abnormal mammogram: No   has had MRI long time ago. Dense breast tissues were reported.   Colonoscopy: Yes   Precancerous polyps: denies   Upper endoscopy: No   Precancerous skin lesions or moles that are being followed by Derm: has moles, has seen Derm.      Reproductive History:  , 2 pregnancy losses  Age first birth: 20  Breast feeding? 3y in total  Menarche (age): [age]  Menopause (age): after uterine ablation at 49 y/o due to bleeding  OCP: a few years, long time ago  HRT: No   Hysterectomy? No   Oophorectomy? 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 " "    Family history:  A six-generation family tree was obtained and scanned to chart.  Pertinent history   Brother (d) \"spider vein brain cancer\"  Brother (d) metastatic melanoma  Brother (d) lung/bone cancer, smoked  Sister (d) metastatic breast cancer at 68, unilateral  Sister (d) metastatic breast cancer at 67, s/p bilateral mastectomy. Uncertain whether she had bilateral breast cancer.   Sister, cervical cancer  Father (d) lung/bone cancer, smoked.     Ashkenazi Uatsdin: Denied    Physical examination:  No physical exam was performed. This is a virtual visit.     Assessment:  Ms. Cornell is a 63 y.o. female with personal h/o HSIL. Family history is notable for late-onset breast cancer in two sisters, cervical cancer in sister, brain cancer in brother, lung/bone cancer in brother and father, and metastatic melanoma in brother. In 2010, she had negative genetic testing for \"BRCA\".    Breast cancer is common with a lifetime risk of 12% in females in general population. We discussed genetics of breast cancer. Most of the cases are multifactorial (polygenic and environmental) in origin, while 5%-10% are caused by single gene mutations. Examples of red flags that raise suspicion for monogenic etiology include early age at onset, bilateral breast cancer, male breast cancer, personal and/or family history of other cancers, Ashkenazi U[place] ancestry, and triple negative breast cancer. Identification of individuals harboring the genetic variant is beneficial since we could provide further medical recommendations as well as obtain familial cascade testing. For example, individuals with pathogenic variants in BRCA1 or BRCA2 have significantly increased risk of breast cancer to 60%-70% by 70 years of age, in addition to risk of other cancers such as ovarian cancer. There is also a role of prophylactic surgery, PARP inhibitor, as well as enrolment in various clinical trials for individuals with BRCA1/2 variants.    Ms. " Kraig does not meet the genetic testing criteria per NCCN. The presence of two family members with late-onset breast cancer does not highly suggest a genetic cancer-predisposition syndrome. The presence of cervical cancer, brain cancer, melanoma, and lung/bone cancer also does not highly suggest an underlying genetic mutation. Genetic testing for her is optional. Ms. Cornell would like to proceed with the test after the discussion.     I discussed that the most appropriate person to have genetic testing for breast cancer genes is the one who is affected with breast cancer. If the pathogenic variant is identified, we could test other first-degree relatives. The reason that we do not test an asymptomatic individual is that the negative result is uninformative. We would not know if it is negative because Ms. Cornell does not inherit the familial variant, if any (true negative) or if the test fails to detect the familial variant (we do not know what to be looking for; false negative). Thus, a negative test does not provide a 100% reassurance that Ms. Cornell has not inherited the familial variant, if any.     We discussed Genetic Information Non-discrimination Act (MIL), which is a federal law that inhibits employer and health insurance to make decision based on genetic information. There is also another layer of protection from state law. It however does not apply to life insurance, nursing home insurance, and disability insurance. Having a genetic variant that predisposes to cancers in an asymptomatic individual may have insurability implications.     Benefits of having genetic test include 1) to establish a molecular diagnosis; 2) to provide further medical recommendations specific to each diagnosis; 3) for familial cascade testing, recurrence risk estimation, and family planning; and 4) to allow for participation in support group organizations and enrolment in clinical trials, if any. Pretest genetic  counseling was provided, including the nature of the test; three types of test result (positive, negative, and uncertain); the fact that a negative result does not exclude a possibility of genetic disorders; retention of de-identified genetic data at the testing company; Genetic Information Non-Discrimination Act (MIL). The patient provided a verbal informed consent.     Plan:  - [facility], DNA/RNA sequencing. Insurance billing. Ms. Cornell is aware of a potential self-pay option of $250.    An emoquo DNA/RNA blood test kit will be sent to the patient's home. The patient will then bring the test kit to a  outpatient blood draw lab to have her blood drawn for testing (using the test tubes provided in the kit). The sample will then be sent to [facility] for analysis. Results are typically available in 3-4 weeks. The patient was instructed to contact my office if she has not received the kit in 2 weeks.     - Will add her on my schedule one the results are available.     Thank you for allowing me to participate in the care of this patient. Please do not hesitate to contact me if you have any questions.     Sincerely,     Bijal Gamez MD    Medical Geneticist  [facility]  Phone: [phone]  Fax: [phone]   Address: [address] [place] [address]  Time spent (this information is required by insurance): face-to-face 44min (2.03pm-2.47pm); preparation 5min; documentation 20min; placing order(s) for genetic testing 5min; total time spent 74min

## 2024-07-16 DIAGNOSIS — K21.9 GASTROESOPHAGEAL REFLUX DISEASE, UNSPECIFIED WHETHER ESOPHAGITIS PRESENT: ICD-10-CM

## 2024-07-16 RX ORDER — PANTOPRAZOLE SODIUM 40 MG/1
40 TABLET, DELAYED RELEASE ORAL DAILY
Qty: 90 TABLET | Refills: 1 | Status: SHIPPED | OUTPATIENT
Start: 2024-07-16

## 2024-07-17 ENCOUNTER — LAB (OUTPATIENT)
Dept: LAB | Facility: LAB | Age: 64
End: 2024-07-17
Payer: COMMERCIAL

## 2024-07-17 DIAGNOSIS — Z80.3 FAMILY HISTORY OF BREAST CANCER IN SISTER: ICD-10-CM

## 2024-07-18 DIAGNOSIS — B97.7 HIGH RISK HPV INFECTION: Primary | ICD-10-CM

## 2024-07-26 ENCOUNTER — PRE-ADMISSION TESTING (OUTPATIENT)
Dept: PREADMISSION TESTING | Facility: HOSPITAL | Age: 64
End: 2024-07-26
Payer: COMMERCIAL

## 2024-07-26 VITALS
WEIGHT: 142 LBS | HEIGHT: 66 IN | BODY MASS INDEX: 22.82 KG/M2 | RESPIRATION RATE: 16 BRPM | OXYGEN SATURATION: 98 % | HEART RATE: 81 BPM | DIASTOLIC BLOOD PRESSURE: 86 MMHG | SYSTOLIC BLOOD PRESSURE: 130 MMHG | TEMPERATURE: 96.6 F

## 2024-07-26 DIAGNOSIS — Z01.818 PRE-OP EXAMINATION: Primary | ICD-10-CM

## 2024-07-26 LAB
ABO GROUP (TYPE) IN BLOOD: NORMAL
ALBUMIN SERPL-MCNC: 4.1 G/DL (ref 3.5–5)
ALP BLD-CCNC: 71 U/L (ref 35–125)
ALT SERPL-CCNC: 8 U/L (ref 5–40)
ANION GAP SERPL CALC-SCNC: 10 MMOL/L
ANTIBODY SCREEN: NORMAL
AST SERPL-CCNC: 13 U/L (ref 5–40)
BASOPHILS # BLD AUTO: 0.02 X10*3/UL (ref 0–0.1)
BASOPHILS NFR BLD AUTO: 0.6 %
BILIRUB SERPL-MCNC: 0.7 MG/DL (ref 0.1–1.2)
BUN SERPL-MCNC: 19 MG/DL (ref 8–25)
CALCIUM SERPL-MCNC: 9.2 MG/DL (ref 8.5–10.4)
CHLORIDE SERPL-SCNC: 103 MMOL/L (ref 97–107)
CO2 SERPL-SCNC: 27 MMOL/L (ref 24–31)
CREAT SERPL-MCNC: 0.8 MG/DL (ref 0.4–1.6)
EGFRCR SERPLBLD CKD-EPI 2021: 83 ML/MIN/1.73M*2
EOSINOPHIL # BLD AUTO: 0.07 X10*3/UL (ref 0–0.7)
EOSINOPHIL NFR BLD AUTO: 1.9 %
ERYTHROCYTE [DISTWIDTH] IN BLOOD BY AUTOMATED COUNT: 12.8 % (ref 11.5–14.5)
GLUCOSE SERPL-MCNC: 97 MG/DL (ref 65–99)
HCT VFR BLD AUTO: 42.4 % (ref 36–46)
HGB BLD-MCNC: 13.5 G/DL (ref 12–16)
IMM GRANULOCYTES # BLD AUTO: 0.01 X10*3/UL (ref 0–0.7)
IMM GRANULOCYTES NFR BLD AUTO: 0.3 % (ref 0–0.9)
LYMPHOCYTES # BLD AUTO: 1.59 X10*3/UL (ref 1.2–4.8)
LYMPHOCYTES NFR BLD AUTO: 43.9 %
MCH RBC QN AUTO: 26.8 PG (ref 26–34)
MCHC RBC AUTO-ENTMCNC: 31.8 G/DL (ref 32–36)
MCV RBC AUTO: 84 FL (ref 80–100)
MONOCYTES # BLD AUTO: 0.35 X10*3/UL (ref 0.1–1)
MONOCYTES NFR BLD AUTO: 9.7 %
NEUTROPHILS # BLD AUTO: 1.58 X10*3/UL (ref 1.2–7.7)
NEUTROPHILS NFR BLD AUTO: 43.6 %
NRBC BLD-RTO: 0 /100 WBCS (ref 0–0)
PLATELET # BLD AUTO: 251 X10*3/UL (ref 150–450)
POTASSIUM SERPL-SCNC: 4.1 MMOL/L (ref 3.4–5.1)
PROT SERPL-MCNC: 6.9 G/DL (ref 5.9–7.9)
RBC # BLD AUTO: 5.03 X10*6/UL (ref 4–5.2)
RH FACTOR (ANTIGEN D): NORMAL
SODIUM SERPL-SCNC: 140 MMOL/L (ref 133–145)
WBC # BLD AUTO: 3.6 X10*3/UL (ref 4.4–11.3)

## 2024-07-26 PROCEDURE — 85025 COMPLETE CBC W/AUTO DIFF WBC: CPT

## 2024-07-26 PROCEDURE — 86901 BLOOD TYPING SEROLOGIC RH(D): CPT

## 2024-07-26 PROCEDURE — 87081 CULTURE SCREEN ONLY: CPT | Mod: TRILAB

## 2024-07-26 PROCEDURE — 99203 OFFICE O/P NEW LOW 30 MIN: CPT

## 2024-07-26 PROCEDURE — 93005 ELECTROCARDIOGRAM TRACING: CPT

## 2024-07-26 PROCEDURE — 36415 COLL VENOUS BLD VENIPUNCTURE: CPT

## 2024-07-26 PROCEDURE — 80053 COMPREHEN METABOLIC PANEL: CPT

## 2024-07-26 RX ORDER — CHLORHEXIDINE GLUCONATE ORAL RINSE 1.2 MG/ML
SOLUTION DENTAL
Qty: 473 ML | Refills: 0 | Status: SHIPPED | OUTPATIENT
Start: 2024-07-26 | End: 2024-08-02 | Stop reason: HOSPADM

## 2024-07-26 RX ORDER — IBUPROFEN 800 MG/1
800 TABLET ORAL EVERY 8 HOURS PRN
COMMUNITY
End: 2024-08-02 | Stop reason: HOSPADM

## 2024-07-26 ASSESSMENT — DUKE ACTIVITY SCORE INDEX (DASI)
CAN YOU DO LIGHT WORK AROUND THE HOUSE LIKE DUSTING OR WASHING DISHES: YES
CAN YOU PARTICIPATE IN STRENOUS SPORTS LIKE SWIMMING, SINGLES TENNIS, FOOTBALL, BASKETBALL, OR SKIING: YES
TOTAL_SCORE: 58.2
CAN YOU DO YARD WORK LIKE RAKING LEAVES, WEEDING OR PUSHING A MOWER: YES
DASI METS SCORE: 9.9
CAN YOU WALK A BLOCK OR TWO ON LEVEL GROUND: YES
CAN YOU HAVE SEXUAL RELATIONS: YES
CAN YOU DO HEAVY WORK AROUND THE HOUSE LIKE SCRUBBING FLOORS OR LIFTING AND MOVING HEAVY FURNITURE: YES
CAN YOU CLIMB A FLIGHT OF STAIRS OR WALK UP A HILL: YES
CAN YOU WALK INDOORS, SUCH AS AROUND YOUR HOUSE: YES
CAN YOU RUN A SHORT DISTANCE: YES
CAN YOU PARTICIPATE IN MODERATE RECREATIONAL ACTIVITIES LIKE GOLF, BOWLING, DANCING, DOUBLES TENNIS OR THROWING A BASEBALL OR FOOTBALL: YES
CAN YOU TAKE CARE OF YOURSELF (EAT, DRESS, BATHE, OR USE TOILET): YES
CAN YOU DO MODERATE WORK AROUND THE HOUSE LIKE VACUUMING, SWEEPING FLOORS OR CARRYING GROCERIES: YES

## 2024-07-26 ASSESSMENT — ENCOUNTER SYMPTOMS
PSYCHIATRIC NEGATIVE: 1
EYES NEGATIVE: 1
MUSCULOSKELETAL NEGATIVE: 1
ROS SKIN COMMENTS: PSORIASIS
CONSTITUTIONAL NEGATIVE: 1
ENDOCRINE NEGATIVE: 1
ALLERGIC/IMMUNOLOGIC NEGATIVE: 1
GASTROINTESTINAL NEGATIVE: 1
CARDIOVASCULAR NEGATIVE: 1
NEUROLOGICAL NEGATIVE: 1
HEMATOLOGIC/LYMPHATIC NEGATIVE: 1
RESPIRATORY NEGATIVE: 1

## 2024-07-26 NOTE — H&P (VIEW-ONLY)
"CPM/PAT Evaluation       Name: Lauren Cornell (Lauren Cornell \"Dejah\")  /Age: 1960/63 y.o.     In-Person       Chief Complaint: Abnormal Pap smear    HPI Lauren Cornell is a 63 year old female with a history of  an abnormal PAP smear. Chart review showed that recent cervix biopsy showed HSIL. A recent pap smear showed positive HPV. She is scheduled to have hysterectomy. She denies abnormal bleeding, bloating, or vaginal pain. She states she has an extensive family history of cancer with her sister having ovarian cancer. She denies fever, chills, nausea, vomiting, chest pain, sob, or palpitations.    Past Medical History:   Diagnosis Date    Abdominal distension (gaseous) 2020    Abdominal bloating    Acute bronchitis 2023    Adverse effect of anesthesia     PT STATES TAKES MORE TO PUT UNDER.    Arthritis knee    Arthritis of both knees 2023    Benzodiazepine causing adverse effect in therapeutic use, subsequent encounter 2023    Body mass index (BMI) 22.0-22.9, adult 2021    Body mass index (BMI) of 22.0 to 22.9 in adult    Carbuncle, unspecified 2014    Recurrent boils    Cervical cancer (Multi) 2024    Contact with and (suspected) exposure to other bacterial communicable diseases 2014    MRSA exposure    Cutaneous abscess of limb, unspecified 10/08/2014    Abscess of axilla    Dental disease     I need 5 teeth pulled they are rotten    Encounter for screening for infections with a predominantly sexual mode of transmission 2016    Screening for STD (sexually transmitted disease)    GERD (gastroesophageal reflux disease)     Years    Headache     Hoarseness of voice 2023    Medial epicondylitis, unspecified elbow 2015    Medial epicondylitis    Muscle pain 2023    Nausea 10/27/2014    Nausea    Nephrolithiasis 2023    Neuropraxia of median nerve 2023    Nutritional deficiency, unspecified 2014    Nutrition " deficiency due to insufficient food    Obstructive sleep apnea syndrome 2023    Other conditions influencing health status 2016    History of cough    Pain of left heel 2023    Personal history of diseases of the skin and subcutaneous tissue 2018    History of impetigo    Personal history of other complications of pregnancy, childbirth and the puerperium     History of spontaneous     Personal history of other diseases of the respiratory system 10/07/2015    History of sinusitis    Personal history of other diseases of the respiratory system 2022    History of acute bronchitis    Personal history of other drug therapy 2016    History of influenza vaccination    Personal history of other infectious and parasitic diseases 2018    History of herpes zoster    Personal history of other infectious and parasitic diseases 2018    History of athlete's foot    Personal history of other medical treatment 10/08/2014    History of screening mammography    Personal history of other medical treatment 10/03/2016    History of screening mammography    Personal history of other specified conditions 2020    History of abdominal pain    Personal history of other specified conditions 10/16/2019    History of dysuria    Personal history of other specified conditions 2020    History of abnormal weight loss    Personal history of other specified conditions 2015    History of diarrhea    Personal history of other specified conditions 2015    History of fatigue    Personal history of other specified conditions 2018    History of insomnia    Personal history of urinary calculi 10/16/2019    History of renal calculi    Personal history of urinary calculi     History of kidney stones    Psoriasis 2023    Urinary tract infection, site not specified 2015    UTI (lower urinary tract infection)       Past Surgical History:   Procedure Laterality Date     BREAST SURGERY  10/08/2014    Breast Surgery    CHOLECYSTECTOMY      COSMETIC SURGERY      GALLBLADDER SURGERY  10/08/2014    Gallbladder Surgery    NOSE SURGERY  01/08/2016    Nose Surgery    SINUS SURGERY      STOMACH SURGERY  10/08/2014    Gastric Surgery     Social History     Tobacco Use    Smoking status: Passive Smoke Exposure - Never Smoker    Smokeless tobacco: Never    Tobacco comments:     not to date only when i was a kid   Substance Use Topics    Alcohol use: Yes     Comment: once in a while     Social History     Substance and Sexual Activity   Drug Use Never       Patient  reports that she is not currently sexually active and has had partner(s) who are male.    Family History   Problem Relation Name Age of Onset    Diabetes Mother Ivanna     Alcohol abuse Father Lund Sr Lung & bone Cancer     Cancer Father Lund Sr Lung & bone Cancer     Lung cancer Father Lund Sr Lung & bone Cancer     Diabetes Sister Cammie Brest Cancer     Ovarian cancer Sister Cammie Brest Cancer     Cancer Sister Cammie Brest Cancer     Miscarriages / Stillbirths Sister Cammie Brest Cancer     Breast cancer Sister Cammie Brest Cancer     Breast cancer Sister      Breast cancer Sister      Cancer Brother Franklyn Brain Cancer     Brain cancer Brother Franklyn Brain Cancer     Cancer Other multiple family members     Diabetes Other multiple family members     Alcohol abuse Brother Ford Lung & Bone cancer     Cancer Brother Ford Lung & Bone cancer     Cancer Sister Rocio Brest Cancer     Miscarriages / Stillbirths Sister Rocio Bre Cancer     Breast cancer Sister Rocio Bre Cancer     Cancer Sister Nina cervical cancer     Miscarriages / Stillbirths Sister Nina cervical cancer     Cancer Brother Gerald Throat & Lung Cancer     Diabetes Brother Jamal     Miscarriages / Stillbirths Sister Pat     Miscarriages / Stillbirths Sister Chriskatia     Skin cancer Brother Gerald        No Known Allergies  Current Outpatient Medications    Medication Sig Dispense Refill    albuterol 90 mcg/actuation inhaler Inhale 2 puffs if needed.      chlorhexidine (Peridex) 0.12 % solution Use as directed 473 mL 0    hydrOXYzine HCL (Atarax) 25 mg tablet Take 1 tablet (25 mg) by mouth as needed at bedtime for anxiety (insomnia). 30 tablet 3    ibuprofen 800 mg tablet Take 1 tablet (800 mg) by mouth every 8 hours if needed for mild pain (1 - 3).      oxybutynin XL (Ditropan-XL) 15 mg 24 hr tablet Take 1 tablet (15 mg) by mouth once daily. 30 tablet 11    pantoprazole (ProtoNix) 40 mg EC tablet TAKE 1 TABLET BY MOUTH EVERY DAY 90 tablet 1    simethicone (Mylicon) 125 mg chewable tablet Chew every 6 hours if needed.       No current facility-administered medications for this visit.         Review of Systems   Constitutional: Negative.    HENT: Negative.     Eyes: Negative.    Respiratory: Negative.     Cardiovascular: Negative.    Gastrointestinal: Negative.    Endocrine: Negative.    Genitourinary: Negative.    Musculoskeletal: Negative.    Skin:         psoriasis   Allergic/Immunologic: Negative.    Neurological: Negative.    Hematological: Negative.    Psychiatric/Behavioral: Negative.                Physical Exam  Vitals reviewed.   Constitutional:       Appearance: Normal appearance. She is normal weight.   HENT:      Head: Normocephalic and atraumatic.      Nose: Nose normal.      Mouth/Throat:      Mouth: Mucous membranes are moist.      Pharynx: Oropharynx is clear.   Eyes:      Extraocular Movements: Extraocular movements intact.      Conjunctiva/sclera: Conjunctivae normal.      Pupils: Pupils are equal, round, and reactive to light.   Cardiovascular:      Rate and Rhythm: Normal rate and regular rhythm.      Pulses: Normal pulses.      Heart sounds: Normal heart sounds.   Pulmonary:      Effort: Pulmonary effort is normal.      Breath sounds: Normal breath sounds.   Abdominal:      General: Bowel sounds are normal.      Palpations: Abdomen is soft.  "  Genitourinary:     Comments: Assessment referred to surgeon  Musculoskeletal:         General: Normal range of motion.      Cervical back: Normal range of motion.   Skin:     General: Skin is warm and dry.      Findings: Lesion (right leg psoriasis) present.   Neurological:      General: No focal deficit present.      Mental Status: She is alert and oriented to person, place, and time.   Psychiatric:         Mood and Affect: Mood normal.         Behavior: Behavior normal.         Thought Content: Thought content normal.         Judgment: Judgment normal.          PAT AIRWAY:   Airway:     Mallampati::  II    TM distance::  >3 FB    Neck ROM::  Full  normal      /86   Pulse 81   Temp 35.9 °C (96.6 °F) (Temporal)   Resp 16   Ht 1.676 m (5' 6\")   Wt 64.4 kg (142 lb)   SpO2 98%   BMI 22.92 kg/m²     ASA: 2  TISH: 1.9%  RCRI: 0.4%      DASI Risk Score      Flowsheet Row Most Recent Value   DASI SCORE 58.2   METS Score (Will be calculated only when all the questions are answered) 9.9          Caprini DVT Assessment    No data to display       Modified Frailty Index    No data to display       CHADS2 Stroke Risk  Current as of 3 hours ago        N/A 3 to 100%: High Risk   2 to < 3%: Medium Risk   0 to < 2%: Low Risk     Last Change: N/A          This score determines the patient's risk of having a stroke if the patient has atrial fibrillation.        This score is not applicable to this patient. Components are not calculated.          Revised Cardiac Risk Index      Flowsheet Row Most Recent Value   Revised Cardiac Risk Calculator 0          Apfel Simplified Score    No data to display       Risk Analysis Index Results This Encounter    No data found in the last 1 encounters.       Stop Bang Score      Flowsheet Row Most Recent Value   Do you snore loudly? 0   Do you often feel tired or fatigued after your sleep? 1   Has anyone ever observed you stop breathing in your sleep? 1   Do you have or are you being " treated for high blood pressure? 0   Recent BMI (Calculated) 22.9   Is BMI greater than 35 kg/m2? 0=No   Age older than 50 years old? 1=Yes   Is your neck circumference greater than 17 inches (Male) or 16 inches (Female)? 0   Gender - Male 0=No   STOP-BANG Total Score 3            Assessment and Plan:     High grade squamous intraepithelial lesion of cervix : Total Laparoscopic Hysterectomy, Salpingo-Oophorectomy, Cystoscopy   Reyes's esophagus : stable. History of gastric sleeve 11 years ago  GERD: stable. Pantoprazole  Hx of sleep apnea: patient states it was weight related and has had no issues since her gastric bypass 11 years ago. Denies use of CPAP  OAB: stable. Patient on Ditropan    EKG completed in PAT  Labs MRSA, CBC, CMP, T&S completed in PAT    Marni Mo, APRN-CNP

## 2024-07-28 LAB — STAPHYLOCOCCUS SPEC CULT: NORMAL

## 2024-07-31 LAB — SCAN RESULT: NORMAL

## 2024-08-01 ENCOUNTER — ANESTHESIA EVENT (OUTPATIENT)
Dept: OPERATING ROOM | Facility: HOSPITAL | Age: 64
End: 2024-08-01
Payer: COMMERCIAL

## 2024-08-01 ENCOUNTER — HOSPITAL ENCOUNTER (OUTPATIENT)
Facility: HOSPITAL | Age: 64
Discharge: HOME | End: 2024-08-02
Attending: OBSTETRICS & GYNECOLOGY | Admitting: OBSTETRICS & GYNECOLOGY
Payer: COMMERCIAL

## 2024-08-01 ENCOUNTER — ANESTHESIA (OUTPATIENT)
Dept: OPERATING ROOM | Facility: HOSPITAL | Age: 64
End: 2024-08-01
Payer: COMMERCIAL

## 2024-08-01 DIAGNOSIS — Z90.710 H/O TOTAL HYSTERECTOMY: Primary | ICD-10-CM

## 2024-08-01 DIAGNOSIS — R87.613 HIGH GRADE SQUAMOUS INTRAEPITHELIAL LESION OF CERVIX: ICD-10-CM

## 2024-08-01 PROBLEM — D06.9 CIN III (CERVICAL INTRAEPITHELIAL NEOPLASIA GRADE III) WITH SEVERE DYSPLASIA: Status: ACTIVE | Noted: 2024-08-01

## 2024-08-01 LAB
ABO GROUP (TYPE) IN BLOOD: NORMAL
ABO GROUP (TYPE) IN BLOOD: NORMAL
ANTIBODY SCREEN: NORMAL
ATRIAL RATE: 73 BPM
ERYTHROCYTE [DISTWIDTH] IN BLOOD BY AUTOMATED COUNT: 12.8 % (ref 11.5–14.5)
HCT VFR BLD AUTO: 39.5 % (ref 36–46)
HGB BLD-MCNC: 12.6 G/DL (ref 12–16)
MCH RBC QN AUTO: 27.4 PG (ref 26–34)
MCHC RBC AUTO-ENTMCNC: 31.9 G/DL (ref 32–36)
MCV RBC AUTO: 86 FL (ref 80–100)
NRBC BLD-RTO: 0 /100 WBCS (ref 0–0)
P AXIS: 49 DEGREES
P OFFSET: 193 MS
P ONSET: 140 MS
PLATELET # BLD AUTO: 202 X10*3/UL (ref 150–450)
PR INTERVAL: 168 MS
Q ONSET: 224 MS
QRS COUNT: 12 BEATS
QRS DURATION: 82 MS
QT INTERVAL: 412 MS
QTC CALCULATION(BAZETT): 453 MS
QTC FREDERICIA: 440 MS
R AXIS: -41 DEGREES
RBC # BLD AUTO: 4.6 X10*6/UL (ref 4–5.2)
RH FACTOR (ANTIGEN D): NORMAL
RH FACTOR (ANTIGEN D): NORMAL
T AXIS: -1 DEGREES
T OFFSET: 430 MS
VENTRICULAR RATE: 73 BPM
WBC # BLD AUTO: 5.9 X10*3/UL (ref 4.4–11.3)

## 2024-08-01 PROCEDURE — 7100000001 HC RECOVERY ROOM TIME - INITIAL BASE CHARGE: Performed by: OBSTETRICS & GYNECOLOGY

## 2024-08-01 PROCEDURE — 36415 COLL VENOUS BLD VENIPUNCTURE: CPT | Performed by: OBSTETRICS & GYNECOLOGY

## 2024-08-01 PROCEDURE — 7100000002 HC RECOVERY ROOM TIME - EACH INCREMENTAL 1 MINUTE: Performed by: OBSTETRICS & GYNECOLOGY

## 2024-08-01 PROCEDURE — 2500000004 HC RX 250 GENERAL PHARMACY W/ HCPCS (ALT 636 FOR OP/ED): Performed by: OBSTETRICS & GYNECOLOGY

## 2024-08-01 PROCEDURE — 2500000004 HC RX 250 GENERAL PHARMACY W/ HCPCS (ALT 636 FOR OP/ED): Mod: JZ | Performed by: OBSTETRICS & GYNECOLOGY

## 2024-08-01 PROCEDURE — 2500000001 HC RX 250 WO HCPCS SELF ADMINISTERED DRUGS (ALT 637 FOR MEDICARE OP): Performed by: OBSTETRICS & GYNECOLOGY

## 2024-08-01 PROCEDURE — 86901 BLOOD TYPING SEROLOGIC RH(D): CPT | Performed by: OBSTETRICS & GYNECOLOGY

## 2024-08-01 PROCEDURE — 2720000007 HC OR 272 NO HCPCS: Performed by: OBSTETRICS & GYNECOLOGY

## 2024-08-01 PROCEDURE — 85027 COMPLETE CBC AUTOMATED: CPT | Performed by: OBSTETRICS & GYNECOLOGY

## 2024-08-01 PROCEDURE — 2500000002 HC RX 250 W HCPCS SELF ADMINISTERED DRUGS (ALT 637 FOR MEDICARE OP, ALT 636 FOR OP/ED)

## 2024-08-01 PROCEDURE — 3700000002 HC GENERAL ANESTHESIA TIME - EACH INCREMENTAL 1 MINUTE: Performed by: OBSTETRICS & GYNECOLOGY

## 2024-08-01 PROCEDURE — 7100000011 HC EXTENDED STAY RECOVERY HOURLY - NURSING UNIT

## 2024-08-01 PROCEDURE — 3600000009 HC OR TIME - EACH INCREMENTAL 1 MINUTE - PROCEDURE LEVEL FOUR: Performed by: OBSTETRICS & GYNECOLOGY

## 2024-08-01 PROCEDURE — 88307 TISSUE EXAM BY PATHOLOGIST: CPT | Mod: TC | Performed by: OBSTETRICS & GYNECOLOGY

## 2024-08-01 PROCEDURE — 2500000004 HC RX 250 GENERAL PHARMACY W/ HCPCS (ALT 636 FOR OP/ED): Performed by: ANESTHESIOLOGIST ASSISTANT

## 2024-08-01 PROCEDURE — 3600000004 HC OR TIME - INITIAL BASE CHARGE - PROCEDURE LEVEL FOUR: Performed by: OBSTETRICS & GYNECOLOGY

## 2024-08-01 PROCEDURE — 3700000001 HC GENERAL ANESTHESIA TIME - INITIAL BASE CHARGE: Performed by: OBSTETRICS & GYNECOLOGY

## 2024-08-01 PROCEDURE — 2500000005 HC RX 250 GENERAL PHARMACY W/O HCPCS: Performed by: ANESTHESIOLOGIST ASSISTANT

## 2024-08-01 RX ORDER — ONDANSETRON HYDROCHLORIDE 2 MG/ML
4 INJECTION, SOLUTION INTRAVENOUS EVERY 6 HOURS PRN
Status: DISCONTINUED | OUTPATIENT
Start: 2024-08-01 | End: 2024-08-02 | Stop reason: HOSPADM

## 2024-08-01 RX ORDER — SODIUM CHLORIDE, SODIUM LACTATE, POTASSIUM CHLORIDE, CALCIUM CHLORIDE 600; 310; 30; 20 MG/100ML; MG/100ML; MG/100ML; MG/100ML
INJECTION, SOLUTION INTRAVENOUS CONTINUOUS PRN
Status: DISCONTINUED | OUTPATIENT
Start: 2024-08-01 | End: 2024-08-01

## 2024-08-01 RX ORDER — HYDRALAZINE HYDROCHLORIDE 20 MG/ML
5 INJECTION INTRAMUSCULAR; INTRAVENOUS EVERY 30 MIN PRN
Status: DISCONTINUED | OUTPATIENT
Start: 2024-08-01 | End: 2024-08-01 | Stop reason: HOSPADM

## 2024-08-01 RX ORDER — POLYETHYLENE GLYCOL 3350 17 G/17G
17 POWDER, FOR SOLUTION ORAL DAILY
Status: DISCONTINUED | OUTPATIENT
Start: 2024-08-01 | End: 2024-08-02 | Stop reason: HOSPADM

## 2024-08-01 RX ORDER — ALBUTEROL SULFATE 0.83 MG/ML
2.5 SOLUTION RESPIRATORY (INHALATION) ONCE AS NEEDED
Status: DISCONTINUED | OUTPATIENT
Start: 2024-08-01 | End: 2024-08-01 | Stop reason: HOSPADM

## 2024-08-01 RX ORDER — ONDANSETRON HYDROCHLORIDE 2 MG/ML
INJECTION, SOLUTION INTRAVENOUS AS NEEDED
Status: DISCONTINUED | OUTPATIENT
Start: 2024-08-01 | End: 2024-08-01

## 2024-08-01 RX ORDER — ONDANSETRON HYDROCHLORIDE 2 MG/ML
4 INJECTION, SOLUTION INTRAVENOUS ONCE AS NEEDED
Status: DISCONTINUED | OUTPATIENT
Start: 2024-08-01 | End: 2024-08-01 | Stop reason: HOSPADM

## 2024-08-01 RX ORDER — BUPIVACAINE HYDROCHLORIDE 5 MG/ML
INJECTION, SOLUTION PERINEURAL AS NEEDED
Status: DISCONTINUED | OUTPATIENT
Start: 2024-08-01 | End: 2024-08-01 | Stop reason: HOSPADM

## 2024-08-01 RX ORDER — SIMETHICONE 80 MG
80 TABLET,CHEWABLE ORAL 4 TIMES DAILY PRN
Status: DISCONTINUED | OUTPATIENT
Start: 2024-08-01 | End: 2024-08-02 | Stop reason: HOSPADM

## 2024-08-01 RX ORDER — FENTANYL CITRATE 50 UG/ML
50 INJECTION, SOLUTION INTRAMUSCULAR; INTRAVENOUS EVERY 5 MIN PRN
Status: DISCONTINUED | OUTPATIENT
Start: 2024-08-01 | End: 2024-08-01 | Stop reason: HOSPADM

## 2024-08-01 RX ORDER — PROPOFOL 10 MG/ML
INJECTION, EMULSION INTRAVENOUS AS NEEDED
Status: DISCONTINUED | OUTPATIENT
Start: 2024-08-01 | End: 2024-08-01

## 2024-08-01 RX ORDER — FENTANYL CITRATE 50 UG/ML
INJECTION, SOLUTION INTRAMUSCULAR; INTRAVENOUS AS NEEDED
Status: DISCONTINUED | OUTPATIENT
Start: 2024-08-01 | End: 2024-08-01

## 2024-08-01 RX ORDER — ROCURONIUM BROMIDE 10 MG/ML
INJECTION, SOLUTION INTRAVENOUS AS NEEDED
Status: DISCONTINUED | OUTPATIENT
Start: 2024-08-01 | End: 2024-08-01

## 2024-08-01 RX ORDER — LIDOCAINE HYDROCHLORIDE 10 MG/ML
INJECTION INFILTRATION; PERINEURAL AS NEEDED
Status: DISCONTINUED | OUTPATIENT
Start: 2024-08-01 | End: 2024-08-01

## 2024-08-01 RX ORDER — MIDAZOLAM HYDROCHLORIDE 1 MG/ML
INJECTION, SOLUTION INTRAMUSCULAR; INTRAVENOUS AS NEEDED
Status: DISCONTINUED | OUTPATIENT
Start: 2024-08-01 | End: 2024-08-01

## 2024-08-01 RX ORDER — ENOXAPARIN SODIUM 100 MG/ML
40 INJECTION SUBCUTANEOUS DAILY
Status: DISCONTINUED | OUTPATIENT
Start: 2024-08-02 | End: 2024-08-01

## 2024-08-01 RX ORDER — OXYBUTYNIN CHLORIDE 5 MG/1
5 TABLET ORAL 3 TIMES DAILY
Status: DISCONTINUED | OUTPATIENT
Start: 2024-08-01 | End: 2024-08-02 | Stop reason: HOSPADM

## 2024-08-01 RX ORDER — IBUPROFEN 600 MG/1
600 TABLET ORAL EVERY 6 HOURS
Status: DISCONTINUED | OUTPATIENT
Start: 2024-08-02 | End: 2024-08-02 | Stop reason: HOSPADM

## 2024-08-01 RX ORDER — CEFAZOLIN SODIUM 2 G/100ML
2 INJECTION, SOLUTION INTRAVENOUS ONCE
Status: COMPLETED | OUTPATIENT
Start: 2024-08-01 | End: 2024-08-01

## 2024-08-01 RX ORDER — KETOROLAC TROMETHAMINE 30 MG/ML
INJECTION, SOLUTION INTRAMUSCULAR; INTRAVENOUS AS NEEDED
Status: DISCONTINUED | OUTPATIENT
Start: 2024-08-01 | End: 2024-08-01

## 2024-08-01 RX ORDER — SODIUM CHLORIDE, SODIUM LACTATE, POTASSIUM CHLORIDE, CALCIUM CHLORIDE 600; 310; 30; 20 MG/100ML; MG/100ML; MG/100ML; MG/100ML
40 INJECTION, SOLUTION INTRAVENOUS CONTINUOUS
Status: DISCONTINUED | OUTPATIENT
Start: 2024-08-01 | End: 2024-08-02 | Stop reason: HOSPADM

## 2024-08-01 RX ORDER — LABETALOL HYDROCHLORIDE 5 MG/ML
5 INJECTION, SOLUTION INTRAVENOUS ONCE AS NEEDED
Status: DISCONTINUED | OUTPATIENT
Start: 2024-08-01 | End: 2024-08-01 | Stop reason: HOSPADM

## 2024-08-01 RX ORDER — PANTOPRAZOLE SODIUM 40 MG/1
40 TABLET, DELAYED RELEASE ORAL DAILY
Status: DISCONTINUED | OUTPATIENT
Start: 2024-08-01 | End: 2024-08-02 | Stop reason: HOSPADM

## 2024-08-01 RX ORDER — MEPERIDINE HYDROCHLORIDE 25 MG/ML
12.5 INJECTION INTRAMUSCULAR; INTRAVENOUS; SUBCUTANEOUS EVERY 10 MIN PRN
Status: DISCONTINUED | OUTPATIENT
Start: 2024-08-01 | End: 2024-08-01 | Stop reason: HOSPADM

## 2024-08-01 RX ORDER — ACETAMINOPHEN 325 MG/1
975 TABLET ORAL ONCE
Status: COMPLETED | OUTPATIENT
Start: 2024-08-01 | End: 2024-08-01

## 2024-08-01 RX ORDER — HYDROXYZINE HYDROCHLORIDE 25 MG/1
25 TABLET, FILM COATED ORAL NIGHTLY PRN
Status: DISCONTINUED | OUTPATIENT
Start: 2024-08-01 | End: 2024-08-02 | Stop reason: HOSPADM

## 2024-08-01 RX ORDER — ACETAMINOPHEN 325 MG/1
975 TABLET ORAL EVERY 6 HOURS
Status: DISCONTINUED | OUTPATIENT
Start: 2024-08-01 | End: 2024-08-02 | Stop reason: HOSPADM

## 2024-08-01 RX ORDER — GABAPENTIN 600 MG/1
600 TABLET ORAL ONCE
Status: COMPLETED | OUTPATIENT
Start: 2024-08-01 | End: 2024-08-01

## 2024-08-01 RX ORDER — OXYCODONE HYDROCHLORIDE 5 MG/1
5 TABLET ORAL EVERY 4 HOURS PRN
Status: DISCONTINUED | OUTPATIENT
Start: 2024-08-01 | End: 2024-08-02 | Stop reason: HOSPADM

## 2024-08-01 RX ORDER — ENOXAPARIN SODIUM 100 MG/ML
40 INJECTION SUBCUTANEOUS EVERY 24 HOURS
Status: DISCONTINUED | OUTPATIENT
Start: 2024-08-02 | End: 2024-08-02 | Stop reason: HOSPADM

## 2024-08-01 RX ORDER — OXYCODONE HYDROCHLORIDE 5 MG/1
10 TABLET ORAL EVERY 4 HOURS PRN
Status: DISCONTINUED | OUTPATIENT
Start: 2024-08-01 | End: 2024-08-02 | Stop reason: HOSPADM

## 2024-08-01 RX ORDER — OXYBUTYNIN CHLORIDE 5 MG/1
15 TABLET, EXTENDED RELEASE ORAL DAILY
Status: DISCONTINUED | OUTPATIENT
Start: 2024-08-01 | End: 2024-08-01

## 2024-08-01 RX ORDER — NALOXONE HYDROCHLORIDE 0.4 MG/ML
0.1 INJECTION, SOLUTION INTRAMUSCULAR; INTRAVENOUS; SUBCUTANEOUS EVERY 5 MIN PRN
Status: DISCONTINUED | OUTPATIENT
Start: 2024-08-01 | End: 2024-08-02 | Stop reason: HOSPADM

## 2024-08-01 RX ORDER — KETOROLAC TROMETHAMINE 30 MG/ML
30 INJECTION, SOLUTION INTRAMUSCULAR; INTRAVENOUS EVERY 6 HOURS
Status: DISCONTINUED | OUTPATIENT
Start: 2024-08-01 | End: 2024-08-02 | Stop reason: HOSPADM

## 2024-08-01 RX ORDER — SODIUM CHLORIDE, SODIUM LACTATE, POTASSIUM CHLORIDE, CALCIUM CHLORIDE 600; 310; 30; 20 MG/100ML; MG/100ML; MG/100ML; MG/100ML
100 INJECTION, SOLUTION INTRAVENOUS CONTINUOUS
Status: DISCONTINUED | OUTPATIENT
Start: 2024-08-01 | End: 2024-08-01 | Stop reason: HOSPADM

## 2024-08-01 SDOH — SOCIAL STABILITY: SOCIAL INSECURITY: HAVE YOU HAD ANY THOUGHTS OF HARMING ANYONE ELSE?: NO

## 2024-08-01 SDOH — SOCIAL STABILITY: SOCIAL INSECURITY: DO YOU FEEL UNSAFE GOING BACK TO THE PLACE WHERE YOU ARE LIVING?: NO

## 2024-08-01 SDOH — SOCIAL STABILITY: SOCIAL INSECURITY: ARE THERE ANY APPARENT SIGNS OF INJURIES/BEHAVIORS THAT COULD BE RELATED TO ABUSE/NEGLECT?: NO

## 2024-08-01 SDOH — SOCIAL STABILITY: SOCIAL INSECURITY: WERE YOU ABLE TO COMPLETE ALL THE BEHAVIORAL HEALTH SCREENINGS?: YES

## 2024-08-01 SDOH — SOCIAL STABILITY: SOCIAL INSECURITY: ARE YOU OR HAVE YOU BEEN THREATENED OR ABUSED PHYSICALLY, EMOTIONALLY, OR SEXUALLY BY ANYONE?: NO

## 2024-08-01 SDOH — HEALTH STABILITY: MENTAL HEALTH: CURRENT SMOKER: 0

## 2024-08-01 SDOH — SOCIAL STABILITY: SOCIAL INSECURITY: DOES ANYONE TRY TO KEEP YOU FROM HAVING/CONTACTING OTHER FRIENDS OR DOING THINGS OUTSIDE YOUR HOME?: NO

## 2024-08-01 SDOH — SOCIAL STABILITY: SOCIAL INSECURITY: HAS ANYONE EVER THREATENED TO HURT YOUR FAMILY OR YOUR PETS?: NO

## 2024-08-01 SDOH — SOCIAL STABILITY: SOCIAL INSECURITY: ABUSE: ADULT

## 2024-08-01 SDOH — SOCIAL STABILITY: SOCIAL INSECURITY: HAVE YOU HAD THOUGHTS OF HARMING ANYONE ELSE?: NO

## 2024-08-01 SDOH — SOCIAL STABILITY: SOCIAL INSECURITY: DO YOU FEEL ANYONE HAS EXPLOITED OR TAKEN ADVANTAGE OF YOU FINANCIALLY OR OF YOUR PERSONAL PROPERTY?: NO

## 2024-08-01 ASSESSMENT — LIFESTYLE VARIABLES
HOW OFTEN DO YOU HAVE 6 OR MORE DRINKS ON ONE OCCASION: LESS THAN MONTHLY
HOW MANY STANDARD DRINKS CONTAINING ALCOHOL DO YOU HAVE ON A TYPICAL DAY: 1 OR 2
HOW OFTEN DO YOU HAVE A DRINK CONTAINING ALCOHOL: MONTHLY OR LESS
AUDIT-C TOTAL SCORE: 2
SUBSTANCE_ABUSE_PAST_12_MONTHS: NO
AUDIT-C TOTAL SCORE: 2
PRESCIPTION_ABUSE_PAST_12_MONTHS: NO
SKIP TO QUESTIONS 9-10: 0

## 2024-08-01 ASSESSMENT — PAIN SCALES - GENERAL
PAINLEVEL_OUTOF10: 2
PAINLEVEL_OUTOF10: 5 - MODERATE PAIN
PAINLEVEL_OUTOF10: 0 - NO PAIN
PAINLEVEL_OUTOF10: 2
PAIN_LEVEL: 0

## 2024-08-01 ASSESSMENT — COGNITIVE AND FUNCTIONAL STATUS - GENERAL
TOILETING: A LITTLE
MOBILITY SCORE: 18
MOVING TO AND FROM BED TO CHAIR: A LITTLE
CLIMB 3 TO 5 STEPS WITH RAILING: A LITTLE
DRESSING REGULAR LOWER BODY CLOTHING: A LITTLE
WALKING IN HOSPITAL ROOM: A LITTLE
CLIMB 3 TO 5 STEPS WITH RAILING: A LITTLE
PATIENT BASELINE BEDBOUND: NO
HELP NEEDED FOR BATHING: A LITTLE
MOVING FROM LYING ON BACK TO SITTING ON SIDE OF FLAT BED WITH BEDRAILS: A LITTLE
MOBILITY SCORE: 23
STANDING UP FROM CHAIR USING ARMS: A LITTLE
DRESSING REGULAR UPPER BODY CLOTHING: A LITTLE
DAILY ACTIVITIY SCORE: 20
TURNING FROM BACK TO SIDE WHILE IN FLAT BAD: A LITTLE

## 2024-08-01 ASSESSMENT — ACTIVITIES OF DAILY LIVING (ADL)
ADEQUATE_TO_COMPLETE_ADL: YES
TOILETING: INDEPENDENT
LACK_OF_TRANSPORTATION: NO
BATHING: INDEPENDENT
HEARING - RIGHT EAR: FUNCTIONAL
DRESSING YOURSELF: INDEPENDENT
FEEDING YOURSELF: INDEPENDENT
ASSISTIVE_DEVICE: NONE;EYEGLASSES
PATIENT'S MEMORY ADEQUATE TO SAFELY COMPLETE DAILY ACTIVITIES?: YES
GROOMING: INDEPENDENT
HEARING - LEFT EAR: FUNCTIONAL
WALKS IN HOME: INDEPENDENT
JUDGMENT_ADEQUATE_SAFELY_COMPLETE_DAILY_ACTIVITIES: YES

## 2024-08-01 ASSESSMENT — PAIN DESCRIPTION - ORIENTATION
ORIENTATION: LOWER
ORIENTATION: LOWER

## 2024-08-01 ASSESSMENT — PATIENT HEALTH QUESTIONNAIRE - PHQ9
2. FEELING DOWN, DEPRESSED OR HOPELESS: NOT AT ALL
SUM OF ALL RESPONSES TO PHQ9 QUESTIONS 1 & 2: 0
1. LITTLE INTEREST OR PLEASURE IN DOING THINGS: NOT AT ALL

## 2024-08-01 ASSESSMENT — PAIN - FUNCTIONAL ASSESSMENT
PAIN_FUNCTIONAL_ASSESSMENT: 0-10

## 2024-08-01 ASSESSMENT — COLUMBIA-SUICIDE SEVERITY RATING SCALE - C-SSRS
1. IN THE PAST MONTH, HAVE YOU WISHED YOU WERE DEAD OR WISHED YOU COULD GO TO SLEEP AND NOT WAKE UP?: NO
6. HAVE YOU EVER DONE ANYTHING, STARTED TO DO ANYTHING, OR PREPARED TO DO ANYTHING TO END YOUR LIFE?: NO
2. HAVE YOU ACTUALLY HAD ANY THOUGHTS OF KILLING YOURSELF?: NO

## 2024-08-01 ASSESSMENT — PAIN DESCRIPTION - LOCATION
LOCATION: ABDOMEN
LOCATION: ABDOMEN

## 2024-08-01 NOTE — INTERVAL H&P NOTE
"Interval Obstetrical Admission History and Physical    Patient Description:  Lauren Cornell is a 63 y.o. with High grade cervical dysplasia, for TLH, BSO, cystoscopy        Laboratory:   Hgb 13    Physical Exam:  BP (!) 134/96   Pulse 77   Temp 36.3 °C (97.3 °F) (Temporal)   Resp 15   Ht 1.676 m (5' 6\")   Wt 64.4 kg (142 lb)   BMI 22.92 kg/m²   NAD  Abd soft, NT, ND    There is no change in physical condition since the previously submitted Admission History and Physical Examination.    Plans: TLH, BSO, cystoscopy  Discussed risks, benefits, potential complications, possible need for laparotomy if scar tissue.   Discussed hospital stay and recovery expectations.  Consent signed.    All questions have been answered to the patient's satisfaction.     No guarantee was expressed or implied as to the results of the procedure.     "

## 2024-08-01 NOTE — OP NOTE
"Date: 2024  OR Location: [facility]    Name: Lauren Cornell \"Dejah\", : 1960, Age: 63 y.o., MRN: [id number], Sex: female    Diagnosis  Pre-op Diagnosis      * High grade squamous intraepithelial lesion of cervix [R87.613] Post-op Diagnosis     * High grade squamous intraepithelial lesion of cervix [R87.613]     Procedures  Total Laparoscopic Hysterectomy, Salpingo-Oophorectomy, Cystoscopy  [address]08 - [place] LAPS TOTAL HYSTERECT 250 GM/< W/RMVL TUBE/OVARY      Surgeons      * Coco Ortiz - Primary    Resident/Fellow/Other Assistant:  Surgeons and Role:     * Nikkie Vanegas MD - Assisting    Procedure Summary  Anesthesia: General  ASA: II  Anesthesia Staff: Anesthesiologist: Benito Cannon MD  C-AA: LEILA Swan  Estimated Blood Loss: 50mL  Intra-op Medications:   Administrations occurring from 1000 to 1230 on 24:   Medication Name Total Dose   BUPivacaine HCl (Marcaine) 0.5 % (5 mg/mL) injection 10 mL   ceFAZolin (Ancef) 2 g in dextrose (iso)  mL 2 g              Anesthesia Record               Intraprocedure I/O Totals          Intake    LR infusion 1100.00 mL    Total Intake 1100 mL       Output    Urine 75 mL    Est. Blood Loss 50 mL    Total Output 125 mL       Net    Net Volume 975 mL          Specimen:   ID Type Source Tests Collected by Time   1 : uterus, cervix, fallopian tubes and ovaries bilateral Tissue UTERUS, CERVIX, FALLOPIAN TUBES AND OVARIES BILATERAL SURGICAL PATHOLOGY EXAM Coco Ortiz MD 2024 1106        Staff:   Circulator: Ankush Tillman Person: Margarita  Circulator: Jason         Procedure Details:  The patient was seen in the preoperative area. The site of surgery was properly noted/marked if necessary per policy. The patient has been actively warmed in preoperative area. Preoperative antibiotics have been ordered and given within 1 hours of incision. Venous thrombosis prophylaxis have been ordered including bilateral sequential compression " devices    The patient was taken to the operating room placed in the operating table.  The surgical pause was performed confirming both patient and the procedure.  General anesthesia was administered and patient was placed in the dorsolithotomy position.  The patient's abdomen, perineum, and vagina were prepped and draped in usual sterile fashion.  Anterior and posterior tractors were inserted in the vagina and the anterior lip of the cervix grasped with single-tooth tenaculum.  The Vcare uterine manipulator was then placed and secured in position without difficulty. The patient was noted to have a very narrow urethral meatus, a standard size vance was unable to be inserted so a 12 Faroese vance was placed.  All instruments were then removed from the vagina.    Attention was then turned to the abdomen.  Secondary to the patient's previous abdominal incisions the decision was made to perform an open entry.  An umbilical skin incision was made and subcutaneous tissue dissected down to the fascia.  The fascia was then nicked and the incision extended superiorly and inferiorly with good visualization of the peritoneum.  The peritoneum was then sharply entered and a Holguin trocar was secured in position without difficulty.  The abdomen is then insufflated under low flow with low pressures noted.  A right lower quadrant final right lower quadrant 5 mm incision and a left lower quadrant 11 mm incision were then made and the respective ports placed without difficulty.  The patient was then placed in steep Trendelenburg and the bowel elevated out of the pelvis.  The listed findings were noted.  Attention was then turned to the left infundibulopelvic ligament which was cauterized and divided.  The left round ligament and left broad ligament were then dissected down to the level of the left uterine vasculature.  A bladder flap was created and carried to the midline.  The left uterine vasculature was then cauterized and divided.   The same dissection was carried out on the right infundibulopelvic ligament right round ligament and right broad ligament.  Again right-sided uterine vasculature was cauterized and divided and the bladder flap created and carried to the previous dissection.  At this time the intended colpotomy site was visualized in its entirety.  The monopolar hook was used to start the dissection at the 12 o'clock position and this was carried around circumferentially along the colpotomy ring.  Specimen was then delivered through the vagina and passed off the field.  A running suture of 0 V-Loc was then used to close the vaginal cuff.  Hemostatic closure was noted.  The pelvis was irrigated and cleared of all clot debris and hemostasis was noted to persist.    At this time a cystoscopy was performed using a pediatric cystoscope.  Indigo carmine was administered by anesthesia and cystoscope demonstrated intact bladder with good filling and bilateral ureteral jets visualized.  The procedure was then concluded.  The vaginal cuff was inspected and hemostasis was noted to persist.  The patient was cleansed and dried and returned to the supine position.  She was reversed of anesthesia extubated and taken the postanesthesia care unit in satisfactory condition.  All sponge, needle, and instrument counts were correct x 2 at the end of the procedure.    Findings: Patient was noted to have a normal-appearing uterus, fallopian tubes, and ovaries bilaterally.  There was bilateral lower quadrant omental adhesions but otherwise pelvis was normal in appearance.  Postprocedure cystoscopy demonstrated bilateral ureteral jets after administration of indigo carmine as well as an intact bladder with no evidence of suture or filling defect.    Complications:  None; patient tolerated the procedure well.     Disposition: PACU - hemodynamically stable.  Condition: stable

## 2024-08-01 NOTE — ANESTHESIA PROCEDURE NOTES
Airway  Date/Time: 8/1/2024 10:30 AM  Urgency: elective    Airway not difficult    Staffing  Performed: VAN   Authorized by: Benito Cannon MD    Performed by: LEILA Swan  Patient location during procedure: OR    Indications and Patient Condition  Indications for airway management: anesthesia  Spontaneous ventilation: present  Sedation level: deep  Preoxygenated: yes  Patient position: sniffing  Mask difficulty assessment: 1 - vent by mask    Final Airway Details  Final airway type: endotracheal airway      Successful airway: ETT  Cuffed: yes   Successful intubation technique: direct laryngoscopy  Facilitating devices/methods: intubating stylet  Endotracheal tube insertion site: oral  Blade: Sandra  Blade size: #3  ETT size (mm): 7.0  Cormack-Lehane Classification: grade I - full view of glottis  Placement verified by: capnometry   Cuff volume (mL): 9  Measured from: lips  ETT to lips (cm): 21  Number of attempts at approach: 1  Number of other approaches attempted: 0    Additional Comments  Intubation by paramedic student  No OG placed previous gastric surgery        
0 = independent

## 2024-08-01 NOTE — ANESTHESIA POSTPROCEDURE EVALUATION
"Patient: Lauren Cornell \"Dejah\"    Procedure Summary       Date: 08/01/24 Room / Location: [facility] 04 / [facility]    Anesthesia Start: 1019 Anesthesia Stop: 1209    Procedure: Total Laparoscopic Hysterectomy, Salpingo-Oophorectomy, Cystoscopy (Bilateral) Diagnosis:       High grade squamous intraepithelial lesion of cervix      (HGSIL)    Surgeons: Coco Ortiz MD Responsible Provider: Benito Cannon MD    Anesthesia Type: general ASA Status: 2            Anesthesia Type: general    Vitals Value Taken Time   /68 08/01/24 1310   Temp 36.3 °C (97.3 °F) 08/01/24 1305   Pulse 72 08/01/24 1310   Resp 14 08/01/24 1310   SpO2 100 % 08/01/24 1317   Vitals shown include unfiled device data.    Anesthesia Post Evaluation    Patient location during evaluation: PACU  Patient participation: complete - patient participated  Level of consciousness: awake and alert  Pain score: 0  Pain management: adequate  Multimodal analgesia pain management approach  Airway patency: patent  Two or more strategies used to mitigate risk of obstructive sleep apnea  Cardiovascular status: acceptable and blood pressure returned to baseline  Respiratory status: acceptable  Hydration status: acceptable  Postoperative Nausea and Vomiting: none        There were no known notable events for this encounter.    "

## 2024-08-01 NOTE — ANESTHESIA PREPROCEDURE EVALUATION
"Patient: Lauren Cornell \"Dejah\"    Procedure Information       Date/Time: 08/01/24 1000    Procedure: Total Laparoscopic Hysterectomy, Salpingo-Oophorectomy, Cystoscopy (Bilateral)    Location: [facility] / [facility]    Surgeons: Coco Ortiz MD            Relevant Problems   Neuro   (+) Carpal tunnel syndrome      GI   (+) GERD (gastroesophageal reflux disease)      Musculoskeletal   (+) Carpal tunnel syndrome     Past Surgical History:   Procedure Laterality Date    BREAST SURGERY  10/08/2014    Breast Surgery    CHOLECYSTECTOMY      COSMETIC SURGERY      GALLBLADDER SURGERY  10/08/2014    Gallbladder Surgery    NOSE SURGERY  01/08/2016    Nose Surgery    SINUS SURGERY      STOMACH SURGERY  10/08/2014    Gastric Surgery       Clinical information reviewed:   Tobacco  Allergies  Meds   Med Hx  Surg Hx  OB Status  Fam Hx  Soc   Hx        NPO Detail:  NPO/Void Status  Carbohydrate Drink Given Prior to Surgery? : N  Date of Last Liquid: 08/01/24  Time of Last Liquid: 0600  Date of Last Solid: 07/31/24  Time of Last Solid: 2300  Last Intake Type: Clear fluids  Time of Last Void: 0850         Physical Exam    Airway  Mallampati: I  TM distance: >3 FB  Neck ROM: full     Cardiovascular   Comments: deferred   Dental   Comments: Poor dentition, needs about 5 pulled   Pulmonary   Comments: deferred   Abdominal     Comments: deferred           Anesthesia Plan    History of general anesthesia?: yes  History of complications of general anesthesia?: no    ASA 2     general     The patient is not a current smoker.  Education provided regarding risk of obstructive sleep apnea.  intravenous induction   Postoperative administration of opioids is intended.  Anesthetic plan and risks discussed with patient.    Plan discussed with CAA.      "

## 2024-08-01 NOTE — CARE PLAN
The patient's goals for the shift include Pain control, comfort and safety, rest.     The clinical goals for the shift include Pain control, maintain safety, promote rest, ERAS protocol, IV fluids    No anticipated barriers towards meeting these goals.       Problem: Pain - Adult  Goal: Verbalizes/displays adequate comfort level or baseline comfort level  Outcome: Progressing     Problem: Safety - Adult  Goal: Free from fall injury  Outcome: Progressing     Problem: Discharge Planning  Goal: Discharge to home or other facility with appropriate resources  Outcome: Progressing     Problem: Chronic Conditions and Co-morbidities  Goal: Patient's chronic conditions and co-morbidity symptoms are monitored and maintained or improved  Outcome: Progressing     Problem: Pain  Goal: Takes deep breaths with improved pain control throughout the shift  Outcome: Progressing  Goal: Turns in bed with improved pain control throughout the shift  Outcome: Progressing  Goal: Walks with improved pain control throughout the shift  Outcome: Progressing  Goal: Performs ADL's with improved pain control throughout shift  Outcome: Progressing  Goal: Participates in PT with improved pain control throughout the shift  Outcome: Progressing  Goal: Free from opioid side effects throughout the shift  Outcome: Progressing  Goal: Free from acute confusion related to pain meds throughout the shift  Outcome: Progressing

## 2024-08-02 ENCOUNTER — PHARMACY VISIT (OUTPATIENT)
Dept: PHARMACY | Facility: CLINIC | Age: 64
End: 2024-08-02
Payer: COMMERCIAL

## 2024-08-02 VITALS
TEMPERATURE: 97 F | SYSTOLIC BLOOD PRESSURE: 126 MMHG | HEIGHT: 66 IN | BODY MASS INDEX: 22.82 KG/M2 | WEIGHT: 142 LBS | DIASTOLIC BLOOD PRESSURE: 76 MMHG | HEART RATE: 87 BPM | OXYGEN SATURATION: 98 % | RESPIRATION RATE: 16 BRPM

## 2024-08-02 PROBLEM — D06.9 CIN III (CERVICAL INTRAEPITHELIAL NEOPLASIA GRADE III) WITH SEVERE DYSPLASIA: Status: RESOLVED | Noted: 2024-08-01 | Resolved: 2024-08-02

## 2024-08-02 LAB
ERYTHROCYTE [DISTWIDTH] IN BLOOD BY AUTOMATED COUNT: 13 % (ref 11.5–14.5)
HCT VFR BLD AUTO: 36 % (ref 36–46)
HGB BLD-MCNC: 11.6 G/DL (ref 12–16)
MCH RBC QN AUTO: 27.2 PG (ref 26–34)
MCHC RBC AUTO-ENTMCNC: 32.2 G/DL (ref 32–36)
MCV RBC AUTO: 85 FL (ref 80–100)
NRBC BLD-RTO: 0 /100 WBCS (ref 0–0)
PLATELET # BLD AUTO: 217 X10*3/UL (ref 150–450)
RBC # BLD AUTO: 4.26 X10*6/UL (ref 4–5.2)
WBC # BLD AUTO: 10.4 X10*3/UL (ref 4.4–11.3)

## 2024-08-02 PROCEDURE — RXMED WILLOW AMBULATORY MEDICATION CHARGE

## 2024-08-02 PROCEDURE — 2500000002 HC RX 250 W HCPCS SELF ADMINISTERED DRUGS (ALT 637 FOR MEDICARE OP, ALT 636 FOR OP/ED)

## 2024-08-02 PROCEDURE — 85027 COMPLETE CBC AUTOMATED: CPT | Performed by: OBSTETRICS & GYNECOLOGY

## 2024-08-02 PROCEDURE — 36415 COLL VENOUS BLD VENIPUNCTURE: CPT | Performed by: OBSTETRICS & GYNECOLOGY

## 2024-08-02 PROCEDURE — 2500000004 HC RX 250 GENERAL PHARMACY W/ HCPCS (ALT 636 FOR OP/ED): Performed by: OBSTETRICS & GYNECOLOGY

## 2024-08-02 PROCEDURE — 7100000011 HC EXTENDED STAY RECOVERY HOURLY - NURSING UNIT

## 2024-08-02 PROCEDURE — 96372 THER/PROPH/DIAG INJ SC/IM: CPT | Performed by: OBSTETRICS & GYNECOLOGY

## 2024-08-02 PROCEDURE — 2500000001 HC RX 250 WO HCPCS SELF ADMINISTERED DRUGS (ALT 637 FOR MEDICARE OP): Performed by: OBSTETRICS & GYNECOLOGY

## 2024-08-02 PROCEDURE — 94762 N-INVAS EAR/PLS OXIMTRY CONT: CPT

## 2024-08-02 RX ORDER — DOCUSATE SODIUM 100 MG/1
100 CAPSULE, LIQUID FILLED ORAL 2 TIMES DAILY PRN
Qty: 30 CAPSULE | Refills: 5 | Status: SHIPPED | OUTPATIENT
Start: 2024-08-02

## 2024-08-02 RX ORDER — IBUPROFEN 600 MG/1
600 TABLET ORAL EVERY 6 HOURS PRN
Qty: 60 TABLET | Refills: 0 | Status: SHIPPED | OUTPATIENT
Start: 2024-08-02

## 2024-08-02 RX ORDER — GABAPENTIN 100 MG/1
100 CAPSULE ORAL EVERY 8 HOURS PRN
Qty: 6 CAPSULE | Refills: 0 | Status: SHIPPED | OUTPATIENT
Start: 2024-08-02 | End: 2024-08-04

## 2024-08-02 RX ORDER — OXYCODONE HYDROCHLORIDE 5 MG/1
5 TABLET ORAL EVERY 6 HOURS PRN
Qty: 15 TABLET | Refills: 0 | Status: SHIPPED | OUTPATIENT
Start: 2024-08-02

## 2024-08-02 RX ORDER — ACETAMINOPHEN 325 MG/1
1000 TABLET ORAL EVERY 6 HOURS
Qty: 60 TABLET | Refills: 0 | Status: SHIPPED | OUTPATIENT
Start: 2024-08-02

## 2024-08-02 SDOH — SOCIAL STABILITY: SOCIAL INSECURITY: WITHIN THE LAST YEAR, HAVE YOU BEEN AFRAID OF YOUR PARTNER OR EX-PARTNER?: NO

## 2024-08-02 SDOH — ECONOMIC STABILITY: FOOD INSECURITY: WITHIN THE PAST 12 MONTHS, YOU WORRIED THAT YOUR FOOD WOULD RUN OUT BEFORE YOU GOT MONEY TO BUY MORE.: NEVER TRUE

## 2024-08-02 SDOH — HEALTH STABILITY: MENTAL HEALTH
HOW OFTEN DO YOU NEED TO HAVE SOMEONE HELP YOU WHEN YOU READ INSTRUCTIONS, PAMPHLETS, OR OTHER WRITTEN MATERIAL FROM YOUR DOCTOR OR PHARMACY?: NEVER

## 2024-08-02 SDOH — HEALTH STABILITY: MENTAL HEALTH
STRESS IS WHEN SOMEONE FEELS TENSE, NERVOUS, ANXIOUS, OR CAN'T SLEEP AT NIGHT BECAUSE THEIR MIND IS TROUBLED. HOW STRESSED ARE YOU?: NOT AT ALL

## 2024-08-02 SDOH — ECONOMIC STABILITY: FOOD INSECURITY: WITHIN THE PAST 12 MONTHS, THE FOOD YOU BOUGHT JUST DIDN'T LAST AND YOU DIDN'T HAVE MONEY TO GET MORE.: NEVER TRUE

## 2024-08-02 SDOH — SOCIAL STABILITY: SOCIAL NETWORK: HOW OFTEN DO YOU GET TOGETHER WITH FRIENDS OR RELATIVES?: MORE THAN THREE TIMES A WEEK

## 2024-08-02 SDOH — SOCIAL STABILITY: SOCIAL INSECURITY
WITHIN THE LAST YEAR, HAVE TO BEEN RAPED OR FORCED TO HAVE ANY KIND OF SEXUAL ACTIVITY BY YOUR PARTNER OR EX-PARTNER?: NO

## 2024-08-02 SDOH — SOCIAL STABILITY: SOCIAL INSECURITY: WITHIN THE LAST YEAR, HAVE YOU BEEN HUMILIATED OR EMOTIONALLY ABUSED IN OTHER WAYS BY YOUR PARTNER OR EX-PARTNER?: NO

## 2024-08-02 SDOH — SOCIAL STABILITY: SOCIAL INSECURITY
WITHIN THE LAST YEAR, HAVE YOU BEEN KICKED, HIT, SLAPPED, OR OTHERWISE PHYSICALLY HURT BY YOUR PARTNER OR EX-PARTNER?: NO

## 2024-08-02 SDOH — SOCIAL STABILITY: SOCIAL NETWORK: ARE YOU MARRIED, WIDOWED, DIVORCED, SEPARATED, NEVER MARRIED, OR LIVING WITH A PARTNER?: MARRIED

## 2024-08-02 SDOH — ECONOMIC STABILITY: INCOME INSECURITY: IN THE PAST 12 MONTHS, HAS THE ELECTRIC, GAS, OIL, OR WATER COMPANY THREATENED TO SHUT OFF SERVICE IN YOUR HOME?: NO

## 2024-08-02 SDOH — SOCIAL STABILITY: SOCIAL NETWORK
IN A TYPICAL WEEK, HOW MANY TIMES DO YOU TALK ON THE PHONE WITH FAMILY, FRIENDS, OR NEIGHBORS?: MORE THAN THREE TIMES A WEEK

## 2024-08-02 SDOH — HEALTH STABILITY: PHYSICAL HEALTH: ON AVERAGE, HOW MANY MINUTES DO YOU ENGAGE IN EXERCISE AT THIS LEVEL?: 0 MIN

## 2024-08-02 SDOH — HEALTH STABILITY: PHYSICAL HEALTH: ON AVERAGE, HOW MANY DAYS PER WEEK DO YOU ENGAGE IN MODERATE TO STRENUOUS EXERCISE (LIKE A BRISK WALK)?: 0 DAYS

## 2024-08-02 SDOH — SOCIAL STABILITY: SOCIAL NETWORK: HOW OFTEN DO YOU ATTEND CHURCH OR RELIGIOUS SERVICES?: PATIENT DECLINED

## 2024-08-02 SDOH — SOCIAL STABILITY: SOCIAL NETWORK
DO YOU BELONG TO ANY CLUBS OR ORGANIZATIONS SUCH AS CHURCH GROUPS UNIONS, FRATERNAL OR ATHLETIC GROUPS, OR SCHOOL GROUPS?: PATIENT DECLINED

## 2024-08-02 SDOH — SOCIAL STABILITY: SOCIAL NETWORK: HOW OFTEN DO YOU ATTENT MEETINGS OF THE CLUB OR ORGANIZATION YOU BELONG TO?: PATIENT DECLINED

## 2024-08-02 ASSESSMENT — PAIN DESCRIPTION - ORIENTATION: ORIENTATION: LOWER

## 2024-08-02 ASSESSMENT — PAIN SCALES - GENERAL
PAINLEVEL_OUTOF10: 3
PAINLEVEL_OUTOF10: 2
PAINLEVEL_OUTOF10: 0 - NO PAIN

## 2024-08-02 ASSESSMENT — PAIN DESCRIPTION - LOCATION
LOCATION: ABDOMEN
LOCATION: ABDOMEN

## 2024-08-02 NOTE — PROGRESS NOTES
"Feeling well, pain controlled, tolerating PO, no n/v. Passing flatus and voiding without difficulty.    /58 (BP Location: Right arm, Patient Position: Lying)   Pulse 85   Temp 36.2 °C (97.2 °F) (Temporal)   Resp 18   Ht 1.676 m (5' 6\")   Wt 64.4 kg (142 lb)   SpO2 97%   BMI 22.92 kg/m²   NAD  Abd soft, NT, ND, incisions c/d/I x 3 + BS  Ext NT, SCDs on    Results for orders placed or performed during the hospital encounter of 08/01/24 (from the past 24 hour(s))   VERIFY ABO/Rh Group Test   Result Value Ref Range    ABO TYPE A     Rh TYPE NEG    Type And Screen   Result Value Ref Range    ABO TYPE A     Rh TYPE NEG     ANTIBODY SCREEN NEG    CBC   Result Value Ref Range    WBC 5.9 4.4 - 11.3 x10*3/uL    nRBC 0.0 0.0 - 0.0 /100 WBCs    RBC 4.60 4.00 - 5.20 x10*6/uL    Hemoglobin 12.6 12.0 - 16.0 g/dL    Hematocrit 39.5 36.0 - 46.0 %    MCV 86 80 - 100 fL    MCH 27.4 26.0 - 34.0 pg    MCHC 31.9 (L) 32.0 - 36.0 g/dL    RDW 12.8 11.5 - 14.5 %    Platelets 202 150 - 450 x10*3/uL   CBC   Result Value Ref Range    WBC 10.4 4.4 - 11.3 x10*3/uL    nRBC 0.0 0.0 - 0.0 /100 WBCs    RBC 4.26 4.00 - 5.20 x10*6/uL    Hemoglobin 11.6 (L) 12.0 - 16.0 g/dL    Hematocrit 36.0 36.0 - 46.0 %    MCV 85 80 - 100 fL    MCH 27.2 26.0 - 34.0 pg    MCHC 32.2 32.0 - 36.0 g/dL    RDW 13.0 11.5 - 14.5 %    Platelets 217 150 - 450 x10*3/uL       A/P POD #1 s/p TLH, BSO, cystoscopy, doing well.  Routine post op care  Meeting all discharge goals  Discharge home, follow up in 3 weeks.  Discharge instructions reviewed.     "

## 2024-08-02 NOTE — CARE PLAN
The patient's goals for the shift include Pain control, discharge planning  Problem: Pain - Adult  Goal: Verbalizes/displays adequate comfort level or baseline comfort level  Outcome: Progressing     Problem: Safety - Adult  Goal: Free from fall injury  Outcome: Progressing     Problem: Discharge Planning  Goal: Discharge to home or other facility with appropriate resources  Outcome: Progressing     Problem: Chronic Conditions and Co-morbidities  Goal: Patient's chronic conditions and co-morbidity symptoms are monitored and maintained or improved  Outcome: Progressing     Problem: Pain  Goal: Takes deep breaths with improved pain control throughout the shift  Outcome: Progressing  Goal: Turns in bed with improved pain control throughout the shift  Outcome: Progressing  Goal: Walks with improved pain control throughout the shift  Outcome: Progressing  Goal: Performs ADL's with improved pain control throughout shift  Outcome: Progressing  Goal: Participates in PT with improved pain control throughout the shift  Outcome: Progressing  Goal: Free from opioid side effects throughout the shift  Outcome: Progressing  Goal: Free from acute confusion related to pain meds throughout the shift  Outcome: Progressing       The clinical goals for the shift include manage pain, ambulate in the quintana, monitor vitals

## 2024-08-02 NOTE — CARE PLAN
The patient's goals for the shift include Pain control    The clinical goals for the shift include manage pain, ambulate in the quintana, monitor vitals      Problem: Safety - Adult  Goal: Free from fall injury  Outcome: Progressing     Problem: Pain  Goal: Takes deep breaths with improved pain control throughout the shift  Outcome: Progressing  Goal: Turns in bed with improved pain control throughout the shift  Outcome: Progressing  Goal: Walks with improved pain control throughout the shift  Outcome: Progressing  Goal: Performs ADL's with improved pain control throughout shift  Outcome: Progressing  Goal: Participates in PT with improved pain control throughout the shift  Outcome: Progressing  Goal: Free from opioid side effects throughout the shift  Outcome: Progressing  Goal: Free from acute confusion related to pain meds throughout the shift  Outcome: Progressing

## 2024-08-02 NOTE — PROGRESS NOTES
08/02/24 1017   Discharge Planning   Living Arrangements Spouse/significant other   Support Systems Spouse/significant other   Assistance Needed independent   Type of Residence Private residence   Number of Stairs to Enter Residence 0  (ramp)   Number of Stairs Within Residence 0   Do you have animals or pets at home? No   Who is requesting discharge planning? Provider   Home or Post Acute Services None   Expected Discharge Disposition Home   Does the patient need discharge transport arranged? No     Home no needs.  DC written

## 2024-08-02 NOTE — DISCHARGE SUMMARY
Discharge Summary    Admission Date: 8/1/2024  Discharge Date: 8/2/2024    Discharge Diagnosis  SERGIO III (cervical intraepithelial neoplasia grade III) with severe dysplasia    Hospital Course  Total laparoscopic hysterectomy, bilateral salpingo-oophorectomy, cystoscopy. Uncomplicated post op course.    Pertinent Physical Exam At Time of Discharge  Normal exam, incisions intact    Last Vitals:  Temp Pulse Resp BP MAP Pulse Ox   36.2 °C (97.2 °F) 85 18 109/58 69 97 %     Discharge Meds     Your medication list        START taking these medications        Instructions Last Dose Given Next Dose Due   acetaminophen 325 mg tablet  Commonly known as: Tylenol      Take 3 tablets (975 mg) by mouth every 6 hours.       docusate sodium 100 mg capsule  Commonly known as: Colace      Take 1 capsule (100 mg) by mouth 2 times a day as needed for constipation.       gabapentin 100 mg capsule  Commonly known as: Neurontin      Take 1 capsule (100 mg) by mouth every 8 hours if needed (pain) for up to 2 days.       oxyCODONE 5 mg immediate release tablet  Commonly known as: Roxicodone      Take 1 tablet (5 mg) by mouth every 6 hours if needed for severe pain (7 - 10).              CHANGE how you take these medications        Instructions Last Dose Given Next Dose Due   ibuprofen 600 mg tablet  What changed:   medication strength  how much to take  when to take this      Take 1 tablet (600 mg) by mouth every 6 hours if needed for mild pain (1 - 3).              CONTINUE taking these medications        Instructions Last Dose Given Next Dose Due   hydrOXYzine HCL 25 mg tablet  Commonly known as: Atarax      Take 1 tablet (25 mg) by mouth as needed at bedtime for anxiety (insomnia).       oxybutynin XL 15 mg 24 hr tablet  Commonly known as: Ditropan-XL      Take 1 tablet (15 mg) by mouth once daily.       pantoprazole 40 mg EC tablet  Commonly known as: ProtoNix      TAKE 1 TABLET BY MOUTH EVERY DAY              STOP taking these  medications      chlorhexidine 0.12 % solution  Commonly known as: Peridex                  Where to Get Your Medications        These medications were sent to [facility]  [address], [address], [place] [address]      Hours: 9 AM to 6 PM Mon-Fri, 9 AM to 1 PM Sat Phone: [phone]   acetaminophen 325 mg tablet  docusate sodium 100 mg capsule  gabapentin 100 mg capsule  ibuprofen 600 mg tablet  oxyCODONE 5 mg immediate release tablet          Complications Requiring Follow-Up  none    Test Results Pending At Discharge  Pending Labs       Order Current Status    Surgical Pathology Exam In process            Outpatient Follow-Up  Future Appointments   Date Time Provider Department Center   8/19/2024  2:50 PM Nick Hernandez MD [id number]   10/3/2024  3:00 PM Paz Beckett PA-C [id number] spent 30 minutes in the professional and overall care of this patient.      Coco Ortiz MD

## 2024-08-05 ENCOUNTER — APPOINTMENT (OUTPATIENT)
Dept: OBSTETRICS AND GYNECOLOGY | Facility: CLINIC | Age: 64
End: 2024-08-05
Payer: COMMERCIAL

## 2024-08-06 ENCOUNTER — APPOINTMENT (OUTPATIENT)
Dept: PREADMISSION TESTING | Facility: HOSPITAL | Age: 64
End: 2024-08-06
Payer: COMMERCIAL

## 2024-08-08 DIAGNOSIS — G47.09 OTHER INSOMNIA: ICD-10-CM

## 2024-08-08 RX ORDER — HYDROXYZINE HYDROCHLORIDE 25 MG/1
TABLET, FILM COATED ORAL
Qty: 90 TABLET | Refills: 3 | Status: SHIPPED | OUTPATIENT
Start: 2024-08-08

## 2024-08-19 ENCOUNTER — APPOINTMENT (OUTPATIENT)
Dept: OBSTETRICS AND GYNECOLOGY | Facility: CLINIC | Age: 64
End: 2024-08-19
Payer: COMMERCIAL

## 2024-08-19 VITALS
SYSTOLIC BLOOD PRESSURE: 132 MMHG | BODY MASS INDEX: 22.98 KG/M2 | DIASTOLIC BLOOD PRESSURE: 78 MMHG | HEIGHT: 66 IN | WEIGHT: 143 LBS

## 2024-08-19 DIAGNOSIS — N87.9: Primary | ICD-10-CM

## 2024-08-19 PROCEDURE — 99213 OFFICE O/P EST LOW 20 MIN: CPT | Performed by: OBSTETRICS & GYNECOLOGY

## 2024-08-19 PROCEDURE — 1036F TOBACCO NON-USER: CPT | Performed by: OBSTETRICS & GYNECOLOGY

## 2024-08-19 PROCEDURE — 3008F BODY MASS INDEX DOCD: CPT | Performed by: OBSTETRICS & GYNECOLOGY

## 2024-08-19 NOTE — PROGRESS NOTES
"Subjective   Patient ID: Lauren Cornell \"Dejah\" is a 63 y.o. female who presents for consult.  New patient 63 years old.  Establishing care.  Recently underwent hysterectomy.  Reviewed her pathology.  Negative for dysplasia.  No postop issues.  She would like to follow-up for annual exams here next year..  On exam abdomen soft nontender incisions healing nicely    Follow-up for annual exam in 1 year.  History of dysplasia treated with complete laparoscopic hysterectomy.  She had a number of questions on what I would have recommended and we discussed options including LEEP versus hysterectomy and the different variables that lead to this decision.        Review of Systems    Objective   Physical Exam    Assessment/Plan            Nick Hernandez MD 08/19/24 3:28 PM   "

## 2024-08-22 ENCOUNTER — LAB REQUISITION (OUTPATIENT)
Dept: LAB | Facility: HOSPITAL | Age: 64
End: 2024-08-22
Payer: COMMERCIAL

## 2024-08-22 DIAGNOSIS — R39.9 UNSPECIFIED SYMPTOMS AND SIGNS INVOLVING THE GENITOURINARY SYSTEM: ICD-10-CM

## 2024-08-22 PROCEDURE — 87086 URINE CULTURE/COLONY COUNT: CPT

## 2024-08-22 PROCEDURE — 87186 SC STD MICRODIL/AGAR DIL: CPT

## 2024-08-25 LAB — BACTERIA UR CULT: ABNORMAL

## 2024-09-26 NOTE — PREPROCEDURE INSTRUCTIONS
Why must I stop eating and drinking near surgery time?  With sedation, food or liquid in your stomach can enter your lungs causing serious complications  Increases nausea and vomiting    When do I need to stop eating and drinking before my surgery?   Do not eat or drink after midnight the night before your surgery/procedure.  You may have small sips of water to take your medication.    PAT DISCHARGE INSTRUCTIONS    Please call the Same Day Surgery (SDS) Department of the hospital where your procedure will be performed after 2:00 PM the day before your surgery. If you are scheduled on a Monday, or a Tuesday following a Monday holiday, you will need to call on the last business day prior to your surgery.      [address] [id number] [place] [id number] [phone]  Second Floor      Please let your surgeon know if:      You develop any open sores, shingles, burning or painful urination as these may increase your risk of an infection.   You no longer wish to have the surgery.   Any other personal circumstances change that may lead to the need to cancel or defer this surgery-such as being sick or getting admitted to any hospital within one week of your planned procedure.    Your contact details change, such as a change of address or phone number.    Starting now:     Please DO NOT drink alcohol or smoke for 24 hours before surgery. It is well known that quitting smoking can make a huge difference to your health and recovery from surgery. The longer you abstain from smoking, the better your chances of a healthy recovery. If you need help with quitting, call 4-800-QUIT-NOW to be connected to a trained counselor who will discuss the best methods to help you quit.     Before your surgery:    Please stop all supplements 7 days prior to surgery. Or as directed by your surgeon.   Please stop taking NSAID pain medicine such as Advil and Motrin 7 days before surgery.    If you  "Daily Note     Today's date: 2024  Patient name: Patrica Montelongo  : 1964  MRN: [id number]  Referring provider: Piyush Crawford MD  Dx:   Encounter Diagnosis     ICD-10-CM    1. Left ankle instability  M25.372       2. Chronic pain of left ankle  M25.572     G89.29           Start Time: 0803  Stop Time: 0846  Total time in clinic (min): 43 minutes    Subjective: Pt reports that she has been completing her HEP, has minimal soreness and some discomfort w/ inversion.       Objective: See treatment diary below      Assessment: Tolerated treatment well. Initiated strengthening program, Pt required minimal cueing for exercises. Pt did well w/ SL balance and demonstrated good ankle stability. Pt only had pain w/ inversion and end range dorsiflexion during manuals. Pt now has full PROM in L ankle. Patient demonstrated fatigue post treatment, exhibited good technique with therapeutic exercises, and would benefit from continued PT for increased ROM, increased strength, increased stability, and decreased symptom irritability.       Plan: Continue per plan of care.      Precautions: Low back pain and Cervical pain     POC expires Unit limit Auth Expiration date PT/OT/ST + Visit Limit?   24 BOMN N/A BOMN                           Visit/Unit Tracking  AUTH Status:  Date              N/A Used 1 2              Remaining                  HEP: [id number]   Manuals            L ankle PROM  JMK           Mid foot G3  JMK           G3 Dorsiflexion  JMK                        Neuro Re-Ed             SLR  2x10           Side lying hip abduction  2x10           Bridge  2x10 3\"           SL stance  Foam 3x30\"                                                  Ther Ex             Hep education 8'            Bike   5'           BAPS board  CW, CCW, DF/PF, I/E x30 ea           BOSU x4  X30 ea           TB ankle x4  GTB 3x10 ea                                                  Ther Activity                      "                  Gait Training                                       Modalities                                             develop any fever, cough, cold, rashes, cuts, scratches, scrapes, urinary symptoms or infection anywhere on your body (including teeth and gums) prior to surgery, please call your surgeon’s office as soon as possible. This may require treatment to reduce the chance of cancellation on the day of surgery.  PLEASE REVIEW ERAS KIT AND INSTRUCTIONS    The day before your surgery:   DIET- Please follow the diet instructions at the top of your packet.   Get a good night’s rest.  Use the special soap for bathing if you have been instructed to use one.    Scheduled surgery times may change and you will be notified if this occurs - please check your personal voicemail for any updates.     On the morning of surgery:   Wear comfortable, loose fitting clothes which open in the front. Please do not wear moisturizers, creams, lotions, makeup or perfume.    Please bring with you to surgery:   Photo ID and insurance card   Current list of medicines and allergies   Pacemaker/ Defibrillator/Heart stent cards   CPAP machine and mask    Slings/ splints/ crutches   A copy of your complete advanced directive/DHPOA.    Please do NOT bring with you to surgery:   All jewelry and valuables should be left at home.   Prosthetic devices such as contact lenses, hearing aids, dentures, eyelash extensions, hairpins and body piercings must be removed prior to going in to the surgical suite.    After outpatient surgery:   A responsible adult MUST accompany you at the time of discharge and stay with you for 24 hours after your surgery. You may NOT drive yourself home after surgery.    Do not drive, operate machinery, make critical decisions or do activities that require co-ordination or balance until after a night’s sleep.   Do not drink alcoholic beverages for 24 hours.   Instructions for resuming your medications will be provided by your surgeon.    CALL YOUR DOCTOR AFTER SURGERY IF YOU HAVE:     Chills and/or a fever of 101° F or higher.     Redness, swelling, pus or drainage from your surgical wound or a bad smell from the wound.    Lightheadedness, fainting or confusion.    Persistent vomiting (throwing up) and are not able to eat or drink for 12 hours.    Three or more loose, watery bowel movements in 24 hours (diarrhea).   Difficulty or pain while urinating( after non-urological surgery)    Pain and swelling in your legs, especially if it is only on one side.    Difficulty breathing or are breathing faster than normal.    Any new concerning symptoms.        Patient Information: Pre-Operative Infection Prevention Measures     Why did I have my nose, under my arms, and groin swabbed?  The purpose of the swab is to identify Staphylococcus aureus inside your nose or on your skin.  The swab was sent to the laboratory for culture.  A positive swab/culture for Staphylococcus aureus is called colonization or carriage.      What is Staphylococcus aureus?  Staphylococcus aureus, also known as “staph”, is a germ found on the skin or in the nose of healthy people.  Sometimes Staphylococcus aureus can get into the body and cause an infection.  This can be minor (such as pimples, boils, or other skin problems).  It might also be serious (such as a blood infection, pneumonia, or a surgical site infection).    What is Staphylococcus aureus colonization or carriage?  Colonization or carriage means that a person has the germ but is not sick from it.  These bacteria can be spread on the hands or when breathing or sneezing.    How is Staphylococcus aureus spread?  It is most often spread by close contact with a person or item that carries it.    What happens if my culture is positive for Staphylococcus aureus?  Your doctor/medical team will use this information to guide any antibiotic treatment which may be necessary.  Regardless of the culture results, we will clean the inside of your nose with a betadine swab just before you have your surgery.      Will I get an  infection if I have Staphylococcus aureus in my nose or on my skin?  Anyone can get an infection with Staphylococcus aureus.  However, the best way to reduce your risk of infection is to follow the instructions provided to you for the use of your CHG soap and dental rinse.        Patient Information: Oral/Dental Rinse    What is oral/dental rinse?   It is a mouthwash. It is a way of cleaning the mouth with a germ-killing solution before your surgery.  The solution contains chlorhexidine, commonly known as CHG.   It is used inside the mouth to kill a bacteria known as Staphylococcus aureus.  Let your doctor know if you are allergic to Chlorhexidine.    Why do I need to use CHG oral/dental rinse?  The CHG oral/dental rinse helps to kill a bacteria in your mouth known as Staphylococcus aureus.     This reduces the risk of infection at the surgical site.      Using your CHG oral/dental rinse  STEPS:  Use your CHG oral/dental rinse after you brush your teeth the night before (at bedtime) and the morning of your surgery.  Follow all directions on your prescription label.    Use the cap on the container to measure 15ml   Swish (gargle if you can) the mouthwash in your mouth for at least 30 seconds, (do not swallow) and spit out  After you use your CHG rinse, do not rinse your mouth with water, drink or eat.  Please refer to the prescription label for the appropriate time to resume oral intake      What side effects might I have using the CHG oral/dental rinse?  CHG rinse will stick to plaque on the teeth.  Brush and floss just before use.  Teeth brushing will help avoid staining of plaque during use.      Patient Information: Home Preoperative Antibacterial Shower      What is a home preoperative antibacterial shower?  This shower is a way of cleaning the skin with a germ-killing solution before surgery.  The solution contains chlorhexidine, commonly known as CHG.  CHG is a skin cleanser with germ-killing ability.  Let  your doctor know if you are allergic to chlorhexidine.    Why do I need to take a preoperative antibacterial shower?  Skin is not sterile.  It is best to try to make your skin as free of germs as possible before surgery.  Proper cleansing with a germ-killing soap before surgery can lower the number of germs on your skin.  This helps to reduce the risk of infection at the surgical site.  Following the instructions listed below will help you prepare your skin for surgery.      How do I use the solution?  Steps:  Begin using your CHG soap 5 days before your scheduled surgery on ________________________.    First, wash and rinse your hair using the CHG soap. Keep CHG soap away from ear canals and eyes.  Rinse completely, do not condition.  Hair extensions should be removed.  Wash your face with your normal soap and rinse.    Apply the CHG solution to a clean wet washcloth.  Turn the water off or move away from the water spray to avoid premature rinsing of the CHG soap as you are applying.   Firmly lather your entire body from the neck down.  Do not use on your face.  Pay special attention to the area(s) where your incision(s) will be located unless they are on your face.  Avoid scrubbing your skin too hard.  The important point is to have the CHG soap sit on your skin for 3 minutes.    When the 3 minutes are up, turn on the water and rinse the CHG solution off your body completely.   DO NOT wash with regular soap after you have used the CHG soap solution  Pat yourself dry with a clean, freshly-laundered towel.  DO NOT apply powders, deodorants, or lotions.  Dress in clean, freshly laundered nightclothes.    Be sure to sleep with clean, freshly laundered sheets.  Be aware that CHG will cause stains on fabrics; if you wash them with bleach after use.  Rinse your washcloth and other linens that have contact with CHG completely.  Use only non-chlorine detergents to launder the items used.   The morning of surgery is the  fifth day.  Repeat the above steps and dress in clean comfortable clothing     Whom should I contact if I have any questions regarding the use of CHG soap?  Call the [facility] at [phone] if you have any questions.                  Medication List            Accurate as of July 26, 2024 10:14 AM. Always use your most recent med list.                albuterol 90 mcg/actuation inhaler  Notes to patient: Take as needed.     hydrOXYzine HCL 25 mg tablet  Commonly known as: Atarax  Take 1 tablet (25 mg) by mouth as needed at bedtime for anxiety (insomnia).  Notes to patient: Take evening dose before surgery.     ibuprofen 800 mg tablet  Medication Adjustments for Surgery: Stop 7 days before surgery     oxybutynin XL 15 mg 24 hr tablet  Commonly known as: Ditropan-XL  Take 1 tablet (15 mg) by mouth once daily.  Notes to patient: Hold dose day of surgery.     pantoprazole 40 mg EC tablet  Commonly known as: ProtoNix  TAKE 1 TABLET BY MOUTH EVERY DAY  Medication Adjustments for Surgery: Take morning of surgery with sip of water, no other fluids     simethicone 125 mg chewable tablet  Commonly known as: Mylicon  Notes to patient: Take as needed.

## 2024-10-03 ENCOUNTER — OFFICE VISIT (OUTPATIENT)
Dept: PRIMARY CARE | Facility: CLINIC | Age: 64
End: 2024-10-03
Payer: COMMERCIAL

## 2024-10-03 ENCOUNTER — APPOINTMENT (OUTPATIENT)
Dept: PRIMARY CARE | Facility: CLINIC | Age: 64
End: 2024-10-03
Payer: COMMERCIAL

## 2024-10-03 VITALS
TEMPERATURE: 97.8 F | SYSTOLIC BLOOD PRESSURE: 120 MMHG | WEIGHT: 139.4 LBS | BODY MASS INDEX: 22.5 KG/M2 | HEART RATE: 87 BPM | DIASTOLIC BLOOD PRESSURE: 82 MMHG | OXYGEN SATURATION: 97 %

## 2024-10-03 DIAGNOSIS — G43.E19 INTRACTABLE CHRONIC MIGRAINE WITH AURA AND WITHOUT STATUS MIGRAINOSUS: Primary | ICD-10-CM

## 2024-10-03 DIAGNOSIS — G47.00 INSOMNIA, UNSPECIFIED TYPE: ICD-10-CM

## 2024-10-03 DIAGNOSIS — K21.9 GASTROESOPHAGEAL REFLUX DISEASE, UNSPECIFIED WHETHER ESOPHAGITIS PRESENT: ICD-10-CM

## 2024-10-03 DIAGNOSIS — M25.561 CHRONIC PAIN OF BOTH KNEES: ICD-10-CM

## 2024-10-03 DIAGNOSIS — G89.29 CHRONIC PAIN OF BOTH KNEES: ICD-10-CM

## 2024-10-03 DIAGNOSIS — M85.80 OSTEOPENIA, UNSPECIFIED LOCATION: ICD-10-CM

## 2024-10-03 DIAGNOSIS — M25.562 CHRONIC PAIN OF BOTH KNEES: ICD-10-CM

## 2024-10-03 PROCEDURE — 99214 OFFICE O/P EST MOD 30 MIN: CPT | Performed by: PHYSICIAN ASSISTANT

## 2024-10-03 PROCEDURE — 1036F TOBACCO NON-USER: CPT | Performed by: PHYSICIAN ASSISTANT

## 2024-10-03 RX ORDER — RIZATRIPTAN BENZOATE 10 MG/1
10 TABLET, ORALLY DISINTEGRATING ORAL ONCE AS NEEDED
Qty: 9 TABLET | Refills: 11 | Status: SHIPPED | OUTPATIENT
Start: 2024-10-03 | End: 2025-10-03

## 2024-10-03 RX ORDER — SUMATRIPTAN 20 MG/1
1 SPRAY NASAL DAILY PRN
Qty: 9 EACH | Refills: 11 | Status: SHIPPED | OUTPATIENT
Start: 2024-10-03

## 2024-10-03 RX ORDER — FAMOTIDINE 40 MG/1
40 TABLET, FILM COATED ORAL NIGHTLY
Qty: 90 TABLET | Refills: 1 | Status: SHIPPED | OUTPATIENT
Start: 2024-10-03 | End: 2025-04-01

## 2024-10-03 RX ORDER — ONDANSETRON 4 MG/1
4 TABLET, ORALLY DISINTEGRATING ORAL EVERY 8 HOURS PRN
Qty: 9 TABLET | Refills: 11 | Status: SHIPPED | OUTPATIENT
Start: 2024-10-03 | End: 2025-10-03

## 2024-10-03 RX ORDER — PANTOPRAZOLE SODIUM 40 MG/1
40 TABLET, DELAYED RELEASE ORAL 2 TIMES DAILY
Qty: 180 TABLET | Refills: 3 | Status: SHIPPED | OUTPATIENT
Start: 2024-10-03 | End: 2025-10-03

## 2024-10-03 NOTE — PROGRESS NOTES
Subjective     HPI   Lauren Cornell is a 63 y.o. year old female patient with presenting to clinic with concern for   Chief Complaint   Patient presents with    New Patient Visit    Migraine     Discuss med to help       Dejah presents  to clinic to establish care. Formerly seen by Preeti Rodriguez CNP.     Anxiety- hydroxyzine at night 25mg qhs    Overactive bladder- ditropan 15xl    Migraines- Had migraines years ago, used to be better until more recently. Needs new imitrex- used injectable and nasal sprays int he past dt need for abrupt intervention. Shots were necessary. Now more options are available. Recommend nasal spray and maxalt MDT options.       GERD- pantoprazole every day insufficiency. Often taking bid, still with breakthrough symptoms.       Had gastric bipass 11 years ago.  - add famotidine at bedtime    Osteopenia- Calcium + Vit D    Knee pain- OA doing shots in knee for pain. Avoiding TKR.         8/1/24 Total hyster & BSO d/t high grade squamous lesions of cervix  SERGIO III (cervical intraepithelial neoplasia grade III) with severe dysplasia   By Dr DOREEN Ortiz    Sister hx ovarian CA    Dr Hernandez    Patient Active Problem List   Diagnosis    Reyes's esophagus    Carpal tunnel syndrome    GERD (gastroesophageal reflux disease)    Insomnia    Migraine headache    OAB (overactive bladder)    Psoriasis    Restless legs syndrome    Vitamin B12 deficiency    Mixed stress and urge urinary incontinence    Health care maintenance       Past Medical History:   Diagnosis Date    Abdominal distension (gaseous) 02/04/2020    Abdominal bloating    Acute bronchitis 04/24/2023    Adverse effect of anesthesia     PT STATES TAKES MORE TO PUT UNDER.    Arthritis knee    Arthritis of both knees 04/24/2023    Benzodiazepine causing adverse effect in therapeutic use, subsequent encounter 08/22/2023    Body mass index (BMI) 22.0-22.9, adult 11/02/2021    Body mass index (BMI) of 22.0 to 22.9 in adult    Sabine  unspecified 2014    Recurrent boils    Cervical cancer 2024    Contact with and (suspected) exposure to other bacterial communicable diseases 2014    MRSA exposure    Cutaneous abscess of limb, unspecified 10/08/2014    Abscess of axilla    Dental disease     I need 5 teeth pulled they are rotten    Encounter for screening for infections with a predominantly sexual mode of transmission 2016    Screening for STD (sexually transmitted disease)    GERD (gastroesophageal reflux disease)     Years    Headache     Hoarseness of voice 2023    Medial epicondylitis, unspecified elbow 2015    Medial epicondylitis    Muscle pain 2023    Nausea 10/27/2014    Nausea    Nephrolithiasis 2023    Neuropraxia of median nerve 2023    Nutritional deficiency, unspecified 2014    Nutrition deficiency due to insufficient food    Obstructive sleep apnea syndrome 2023    not current, resolved after gastric bypass surgery with weight loss    Other conditions influencing health status 2016    History of cough    Pain of left heel 2023    Personal history of diseases of the skin and subcutaneous tissue 2018    History of impetigo    Personal history of other complications of pregnancy, childbirth and the puerperium     History of spontaneous     Personal history of other diseases of the respiratory system 10/07/2015    History of sinusitis    Personal history of other diseases of the respiratory system 2022    History of acute bronchitis    Personal history of other drug therapy 2016    History of influenza vaccination    Personal history of other infectious and parasitic diseases 2018    History of herpes zoster    Personal history of other infectious and parasitic diseases 2018    History of athlete's foot    Personal history of other medical treatment 10/08/2014    History of screening mammography    Personal history of other  medical treatment 10/03/2016    History of screening mammography    Personal history of other specified conditions 02/04/2020    History of abdominal pain    Personal history of other specified conditions 10/16/2019    History of dysuria    Personal history of other specified conditions 08/03/2020    History of abnormal weight loss    Personal history of other specified conditions 06/19/2015    History of diarrhea    Personal history of other specified conditions 06/09/2015    History of fatigue    Personal history of other specified conditions 02/26/2018    History of insomnia    Personal history of urinary calculi 10/16/2019    History of renal calculi    Personal history of urinary calculi     History of kidney stones    Psoriasis 04/24/2023    Urinary tract infection, site not specified 06/22/2015    UTI (lower urinary tract infection)      Past Surgical History:   Procedure Laterality Date    BREAST SURGERY  10/08/2014    Breast Surgery    CHOLECYSTECTOMY      COSMETIC SURGERY      GALLBLADDER SURGERY  10/08/2014    Gallbladder Surgery    NOSE SURGERY  01/08/2016    Nose Surgery    SINUS SURGERY      STOMACH SURGERY  10/08/2014    Gastric Surgery      Family History   Problem Relation Name Age of Onset    Diabetes Mother Ivanna     Alcohol abuse Father Lund Sr Lung & bone Cancer     Cancer Father Lund Sr Lung & bone Cancer     Lung cancer Father Lund Sr Lung & bone Cancer     Diabetes Sister Cammie Brest Cancer     Ovarian cancer Sister Cammie Brest Cancer     Cancer Sister Cammie Brest Cancer     Miscarriages / Stillbirths Sister Cammie Brest Cancer     Breast cancer Sister Cammie Brest Cancer     Breast cancer Sister      Breast cancer Sister      Cancer Brother Franklyn Brain Cancer     Brain cancer Brother Franklyn Brain Cancer     Cancer Other multiple family members     Diabetes Other multiple family members     Alcohol abuse Brother Ford Lung & Bone cancer     Cancer Brother Ford Lung & Bone cancer      Cancer Sister Rocio Foster Cancer     Miscarriages / Stillbirths Sister Rocio Foster Cancer     Breast cancer Sister Rocio Foster Cancer     Cancer Sister Nina cervical cancer     Miscarriages / Stillbirths Sister Nina cervical cancer     Cancer Brother Gerald Throat & Lung Cancer     Diabetes Brother Jamal     Miscarriages / Stillbirths Sister Erin     Miscarriages / Stillbirths Sister Gena     Skin cancer Brother Gerald       Social History     Tobacco Use    Smoking status: Never     Passive exposure: Yes    Smokeless tobacco: Never    Tobacco comments:     not to date only when i was a kid   Substance Use Topics    Alcohol use: Yes     Comment: once in a while        Current Outpatient Medications:     acetaminophen (Tylenol) 325 mg tablet, Take 3 tablets (975 mg) by mouth every 6 hours., Disp: 60 tablet, Rfl: 0    docusate sodium (Colace) 100 mg capsule, Take 1 capsule (100 mg) by mouth 2 times a day as needed for constipation., Disp: 30 capsule, Rfl: 5    hydrOXYzine HCL (Atarax) 25 mg tablet, TAKE 1 TABLET BY MOUTH AS NEEDED AT BEDTIME FOR ANXIETY/INSOMNIA, Disp: 90 tablet, Rfl: 3    ibuprofen 600 mg tablet, Take 1 tablet (600 mg) by mouth every 6 hours if needed for mild pain (1 - 3)., Disp: 60 tablet, Rfl: 0    oxybutynin XL (Ditropan-XL) 15 mg 24 hr tablet, Take 1 tablet (15 mg) by mouth once daily., Disp: 30 tablet, Rfl: 11    oxyCODONE (Roxicodone) 5 mg immediate release tablet, Take 1 tablet (5 mg) by mouth every 6 hours if needed for severe pain (7 - 10)., Disp: 15 tablet, Rfl: 0    pantoprazole (ProtoNix) 40 mg EC tablet, TAKE 1 TABLET BY MOUTH EVERY DAY, Disp: 90 tablet, Rfl: 1    gabapentin (Neurontin) 100 mg capsule, Take 1 capsule (100 mg) by mouth every 8 hours if needed (pain) for up to 2 days., Disp: 6 capsule, Rfl: 0     Review of Systems  Constitutional: Denies fever  HEENT: Denies ST, earache  CVS: Denies Chest pain  Pulmonary: Denies wheezing, SOB  GI: Denies N/V  : Denies  dysuria  Musculoskeletal:  Denies myalgia  Neuro: Denies focal weakness or numbness.  Skin: Denies Rashes.  *Review of Systems is negative unless otherwise mentioned in HPI or ROS above.    Objective   /82   Pulse 87   Temp 36.6 °C (97.8 °F)   Wt 63.2 kg (139 lb 6.4 oz)   SpO2 97%   BMI 22.50 kg/m²  reviewed Body mass index is 22.5 kg/m².     Physical Exam  Constitutional: NAD.  Resting comfortably.  Head: Atraumatic, normocephalic.  ENT: Moist oral mucosa. Nasal mucosa wnl.   Cardiac: Regular rate & rhythm.   Pulmonary: Lungs clear bilat  GI: Soft, Nontender, nondistended.   Musculoskeletal: No peripheral edema.   Skin: No evidence of trauma. No rashes  Psych: Intact judgement and insight.    .Assessment/Plan   Problem List Items Addressed This Visit             ICD-10-CM    Chronic pain of both knees M25.561, M25.562, G89.29    Relevant Orders    Referral to Orthopaedic Surgery    GERD (gastroesophageal reflux disease) K21.9    Relevant Medications    pantoprazole (ProtoNix) 40 mg EC tablet    famotidine (Pepcid) 40 mg tablet    Migraine headache - Primary G43.909    Relevant Medications    rizatriptan MLT (Maxalt-MLT) 10 mg disintegrating tablet    SUMAtriptan (Imitrex) 20 mg/actuation nasal spray    ondansetron ODT (Zofran-ODT) 4 mg disintegrating tablet    Osteopenia M85.80

## 2024-10-09 ENCOUNTER — OFFICE VISIT (OUTPATIENT)
Dept: ORTHOPEDIC SURGERY | Facility: CLINIC | Age: 64
End: 2024-10-09
Payer: COMMERCIAL

## 2024-10-09 DIAGNOSIS — M25.561 CHRONIC PAIN OF BOTH KNEES: ICD-10-CM

## 2024-10-09 DIAGNOSIS — M25.562 CHRONIC PAIN OF BOTH KNEES: ICD-10-CM

## 2024-10-09 DIAGNOSIS — G89.29 CHRONIC PAIN OF BOTH KNEES: ICD-10-CM

## 2024-10-09 DIAGNOSIS — M17.0 PRIMARY OSTEOARTHRITIS OF BOTH KNEES: Primary | ICD-10-CM

## 2024-10-09 PROCEDURE — 99203 OFFICE O/P NEW LOW 30 MIN: CPT | Performed by: STUDENT IN AN ORGANIZED HEALTH CARE EDUCATION/TRAINING PROGRAM

## 2024-10-09 PROCEDURE — 99213 OFFICE O/P EST LOW 20 MIN: CPT | Performed by: STUDENT IN AN ORGANIZED HEALTH CARE EDUCATION/TRAINING PROGRAM

## 2024-10-09 NOTE — PROGRESS NOTES
Sports Medicine Office Note    Today's Date:  10/09/2024     HPI: Lauren Cornell is a 63 y.o. female with known history of bilateral knee OA who presents today for evaluation of chronic bilateral knee pain.    She states she has had pain for several years and denies any associated injury/trauma.  She previously followed with Dr. Birmingham and was getting viscosupplementation injections which have worked very well for her.  She states last viscosupplementation injection with Durolane gave her roughly 1 year of relief, however pain has returned over the last 2 to 3 months without new injury.  States pain is progressively worsening and occasionally has mild swelling in either knee.  Denies any redness, warmth, numbness, tingling, weakness, or radiation of pain.  She takes OTC Tylenol and ibuprofen as needed with slight improvement.  She also wears compression knee sleeves with some relief.  She is not following with any specific home exercise program, but does exercise regularly.  She has tried cortisone injections into both of her knees previously which stopped providing relief.  She is interested in repeating viscosupplementation injections if appropriate.    She has no other complaints.    Physical Examination:     The RIGHT knee has trace to grade 1 joint effusion. Patella crepitus and grind are positive. There is tenderness to the medial and lateral joint lines. Flexion and extension are without mechanical blocking. There is no instability with stress testing.  The LEFT knee has trace to grade 1 joint effusion. Patella crepitus and grind are positive. There is tenderness to the medial and lateral joint lines. Flexion and extension are without mechanical blocking. There is no instability with stress testing.  Skin - no rashes, sores, or open lesions. Strength, sensory and vascular exams are otherwise normal. There is no clubbing, cyanosis or edema.  Gait is slightly antalgic and tandem.     Imaging:  Radiographs  of bilateral knees obtained on 5/11/2023 were reviewed and revealed mild to moderate DJD, most significant in patellofemoral compartment, without acute osseous abnormalities.  The studies were reviewed by me personally in the office today.    Problem List Items Addressed This Visit             ICD-10-CM    Chronic pain of both knees M25.561, M25.562, G89.29     Other Visit Diagnoses         Codes    Primary osteoarthritis of both knees    -  Primary M17.0          Assessment and Plan:     We reviewed the exam and x-ray findings and discussed the conservative and surgical treatment options. We agreed to continue with conservative management for chronic bilateral knee pain with associated DJD.  She may continue prescription doses of Tylenol and recommended trying topical Voltaren gel up to 3 times daily as needed for pain.  She may continue compression sleeves with daily activity.  Discussed activity modifications.  We will submit for insurance approval of viscosupplementation injections for bilateral knee DJD.  Plan for follow-up if/when viscosupplementation injections approved through insurance, but she may follow-up sooner if any new concerns arise.  Discussed this plan with patient who is understanding and agreeable.    **This note was dictated using Dragon speech recognition software and was not corrected for spelling or grammatical errors**.    Cayetano Low DO  Primary Care Sports Medicine  [facility] [place]

## 2024-10-25 DIAGNOSIS — M17.0 PRIMARY OSTEOARTHRITIS OF BOTH KNEES: ICD-10-CM

## 2024-10-25 RX ORDER — HYLAN G-F 20 16MG/2ML
SYRINGE (ML) INTRAARTICULAR
Qty: 12 ML | Refills: 0 | Status: SHIPPED | OUTPATIENT
Start: 2024-10-25

## 2024-11-01 ENCOUNTER — ANCILLARY PROCEDURE (OUTPATIENT)
Dept: URGENT CARE | Age: 64
End: 2024-11-01
Payer: COMMERCIAL

## 2024-11-01 ENCOUNTER — OFFICE VISIT (OUTPATIENT)
Dept: URGENT CARE | Age: 64
End: 2024-11-01
Payer: COMMERCIAL

## 2024-11-01 VITALS
HEIGHT: 66 IN | OXYGEN SATURATION: 96 % | DIASTOLIC BLOOD PRESSURE: 83 MMHG | BODY MASS INDEX: 22.75 KG/M2 | WEIGHT: 141.54 LBS | RESPIRATION RATE: 18 BRPM | SYSTOLIC BLOOD PRESSURE: 124 MMHG | TEMPERATURE: 97.7 F | HEART RATE: 87 BPM

## 2024-11-01 DIAGNOSIS — R52 PAIN: ICD-10-CM

## 2024-11-01 DIAGNOSIS — M79.644 THUMB PAIN, RIGHT: Primary | ICD-10-CM

## 2024-11-01 RX ORDER — SULFAMETHOXAZOLE AND TRIMETHOPRIM 800; 160 MG/1; MG/1
1 TABLET ORAL 2 TIMES DAILY
Qty: 14 TABLET | Refills: 0 | Status: SHIPPED | OUTPATIENT
Start: 2024-11-01 | End: 2024-11-08

## 2024-11-01 ASSESSMENT — ENCOUNTER SYMPTOMS
CHILLS: 0
NAUSEA: 0
NUMBNESS: 0
NECK PAIN: 0
FEVER: 0
LIGHT-HEADEDNESS: 0
BACK PAIN: 0
VOMITING: 0
WOUND: 0
SHORTNESS OF BREATH: 0
WEAKNESS: 0
NECK STIFFNESS: 0

## 2024-11-25 ENCOUNTER — OFFICE VISIT (OUTPATIENT)
Dept: ORTHOPEDIC SURGERY | Facility: CLINIC | Age: 64
End: 2024-11-25
Payer: COMMERCIAL

## 2024-11-25 ENCOUNTER — HOSPITAL ENCOUNTER (OUTPATIENT)
Dept: RADIOLOGY | Facility: EXTERNAL LOCATION | Age: 64
Discharge: HOME | End: 2024-11-25

## 2024-11-25 DIAGNOSIS — M17.0 PRIMARY OSTEOARTHRITIS OF BOTH KNEES: Primary | ICD-10-CM

## 2024-11-25 PROCEDURE — 99213 OFFICE O/P EST LOW 20 MIN: CPT | Performed by: STUDENT IN AN ORGANIZED HEALTH CARE EDUCATION/TRAINING PROGRAM

## 2024-11-25 PROCEDURE — 20611 DRAIN/INJ JOINT/BURSA W/US: CPT | Mod: 50 | Performed by: STUDENT IN AN ORGANIZED HEALTH CARE EDUCATION/TRAINING PROGRAM

## 2024-11-25 PROCEDURE — 2500000004 HC RX 250 GENERAL PHARMACY W/ HCPCS (ALT 636 FOR OP/ED): Mod: JZ | Performed by: STUDENT IN AN ORGANIZED HEALTH CARE EDUCATION/TRAINING PROGRAM

## 2024-11-25 NOTE — PROGRESS NOTES
Sports Medicine Office Note    Today's Date:  11/25/2024     HPI: Lauren Cornell is a 63 y.o. female with known history of bilateral knee OA who presents today for follow-up of chronic bilateral knee pain and trial of viscosupplementation injections with Synvisc-One.    10/9/2024: She states she has had pain for several years and denies any associated injury/trauma. She previously followed with Dr. Birmingham and was getting viscosupplementation injections which have worked very well for her. She states last viscosupplementation injection with Durolane gave her roughly 1 year of relief, however pain has returned over the last 2 to 3 months without new injury. States pain is progressively worsening and occasionally has mild swelling in either knee. Denies any redness, warmth, numbness, tingling, weakness, or radiation of pain. She takes OTC Tylenol and ibuprofen as needed with slight improvement. She also wears compression knee sleeves with some relief. She is not following with any specific home exercise program, but does exercise regularly. She has tried cortisone injections into both of her knees previously which stopped providing relief. She is interested in repeating viscosupplementation injections if appropriate.     11/25/2024: She presents today for follow-up of chronic bilateral knee pain with DJD, and trial of repeat viscosupplementation injections with Synvisc-One.  She denies any interval injury/trauma.  Also denies any new swelling, redness, warmth, bruising, numbness, tingling, weakness, or radiation of pain.  She would like to proceed with viscosupplementation injections today.    She has no other complaints.    Physical Examination:     The RIGHT knee has trace to grade 1 joint effusion. Patella crepitus and grind are positive. There is tenderness to the medial and lateral joint lines. Flexion and extension are without mechanical blocking. There is no instability with stress testing.  The LEFT knee  "has trace to grade 1 joint effusion. Patella crepitus and grind are positive. There is tenderness to the medial and lateral joint lines. Flexion and extension are without mechanical blocking. There is no instability with stress testing.  Skin - no rashes, sores, or open lesions. Strength, sensory and vascular exams are otherwise normal. There is no clubbing, cyanosis or edema.  Gait is slightly antalgic and tandem.     Imaging:  Radiographs of bilateral knees obtained on 5/11/2023 were reviewed and revealed mild to moderate DJD, most significant in patellofemoral compartment, without acute osseous abnormalities.    The studies were reviewed by me personally in the office today.    11/25/2024: No new radiographs obtained today.    Problem List Items Addressed This Visit    None  Visit Diagnoses         Codes    Primary osteoarthritis of both knees    -  Primary M17.0    Relevant Orders    Point of Care Ultrasound (Completed)          Procedure Note:  After consent was obtained, the right knee was prepped in a sterile fashion. Ultrasound guidance was used to help insure proper needle placement into the  joint , decrease patient discomfort, and decrease collateral damage. The joint was visualized and Synvisc-One 48 mg was injected without any complications. Ultrasound images were saved on an internal file for later reference. The patient tolerated the procedure well and the area was cleaned and bandaged.    After consent was obtained, the left knee was prepped in a sterile fashion. Ultrasound guidance was used to help insure proper needle placement into the  joint , decrease patient discomfort, and decrease collateral damage. The joint was visualized and Synvisc-One 48 mg was injected without any complications. Ultrasound images were saved on an internal file for later reference. The patient tolerated the procedure well and the area was cleaned and bandaged.  Patient ID: Lauren Cornell \"Dejah\" is a 63 y.o. " female.    L Inj/Asp: bilateral knee on 11/25/2024 3:52 PM  Indications: pain  Details: 21 G needle, ultrasound-guided superolateral approach  Medications (Right): 48 mg hylan 48 mg/6 mL  Medications (Left): 48 mg hylan 48 mg/6 mL  Outcome: tolerated well, no immediate complications  Procedure, treatment alternatives, risks and benefits explained, specific risks discussed. Consent was given by the patient. Immediately prior to procedure a time out was called to verify the correct patient, procedure, equipment, support staff and site/side marked as required. Patient was prepped and draped in the usual sterile fashion.         Assessment and Plan:    We reviewed the exam and x-ray findings and discussed the conservative and surgical treatment options. We agreed to repeat viscosupplementation injection into bilateral knee joints with Synvisc-One.   She tolerated this well. Activity modifications were reviewed.  Discussed the importance of regular home exercises.  Recommended prescription doses of Tylenol, Voltaren gel, and encouraged use of ice or heat as needed for acute flares of pain.  She may continue compression sleeves with daily activity.  Plan for follow-up in 8 weeks for re-evaluation, otherwise may follow-up sooner if any new concerns arise.  Discussed this plan with the patient who is understanding and agreeable.    **This note was dictated using Dragon speech recognition software and was not corrected for spelling or grammatical errors**.    Cayetano Low,   Primary Care Sports Medicine  [facility] [place]

## 2024-12-02 ENCOUNTER — APPOINTMENT (OUTPATIENT)
Dept: SLEEP MEDICINE | Facility: CLINIC | Age: 64
End: 2024-12-02
Payer: COMMERCIAL

## 2024-12-19 ENCOUNTER — APPOINTMENT (OUTPATIENT)
Dept: SLEEP MEDICINE | Facility: CLINIC | Age: 64
End: 2024-12-19
Payer: COMMERCIAL

## 2024-12-19 ENCOUNTER — TELEPHONE (OUTPATIENT)
Dept: PRIMARY CARE | Facility: CLINIC | Age: 64
End: 2024-12-19

## 2024-12-19 VITALS
HEART RATE: 88 BPM | WEIGHT: 139.2 LBS | DIASTOLIC BLOOD PRESSURE: 69 MMHG | SYSTOLIC BLOOD PRESSURE: 115 MMHG | OXYGEN SATURATION: 96 % | BODY MASS INDEX: 22.47 KG/M2

## 2024-12-19 DIAGNOSIS — G47.61 PERIODIC LIMB MOVEMENTS OF SLEEP: ICD-10-CM

## 2024-12-19 DIAGNOSIS — R30.0 DYSURIA: Primary | ICD-10-CM

## 2024-12-19 DIAGNOSIS — F51.04 CHRONIC INSOMNIA: Primary | ICD-10-CM

## 2024-12-19 PROCEDURE — 1036F TOBACCO NON-USER: CPT | Performed by: PSYCHIATRY & NEUROLOGY

## 2024-12-19 PROCEDURE — 99204 OFFICE O/P NEW MOD 45 MIN: CPT | Performed by: PSYCHIATRY & NEUROLOGY

## 2024-12-19 RX ORDER — ROPINIROLE 1 MG/1
TABLET, FILM COATED ORAL
Qty: 120 TABLET | Refills: 0 | Status: SHIPPED | OUTPATIENT
Start: 2024-12-19

## 2024-12-19 ASSESSMENT — PATIENT HEALTH QUESTIONNAIRE - PHQ9
1. LITTLE INTEREST OR PLEASURE IN DOING THINGS: NOT AT ALL
SUM OF ALL RESPONSES TO PHQ9 QUESTIONS 1 AND 2: 0
2. FEELING DOWN, DEPRESSED OR HOPELESS: NOT AT ALL

## 2024-12-19 NOTE — PATIENT INSTRUCTIONS
"For your leg movements during sleep, start ropinirole at bedtime - see prescription details.    For Your Insomnia:    --There is also an online program for CBT-I called \"GO! To Sleep\". Information is below.      Good Sleep Hygiene  1. Wake up at the same time every day, even on the weekends.  2. Use your bed for sleep and intimacy only.  3. If you have been in bed awake for 30 minutes, get up and leave the bedroom. Choose a dull activity not involving a blue screen (TV, computer, handheld devices). Go back to bed when you  feel sleepy.  4. Avoid caffeine, nicotine and alcohol before you go to bed.  5. Avoid large meals before you go to bed.  6. Exercise regularly, but do not exercise right before you go to bed.  7. Avoid daytime naps. If you do take a nap, sleep for 20-40 minutes, and not after dinner.    Patients with insomnia may associate their bed and bedroom with the fear of not sleeping or other arousing events, rather than the more pleasurable anticipation of sleep. The longer one  stays in bed trying to sleep, the stronger the association becomes. This perpetuates the difficulty falling asleep.    Stimulus control therapy is a strategy whose purpose is to disrupt this association by enhancing the likelihood of sleep . Patients should not go to bed until they are sleepy and should use the bed primarily for sleep (and not for reading, watching television, eating, or worrying). They should not spend more than 20 minutes in bed awake. If they are awake after 20 minutes, they should leave the bedroom and engage in a relaxing activity, such as reading or listening to soothing music.    Patients should not engage in activities that stimulate them or reward them for being awake in the middle of the night, such as eating or watching television. In addition, they should not return  to bed until they are tired and feel ready to sleep. If they return to bed and still cannot sleep within 20 minutes, the process should be " repeated. An alarm clock should be set to wake the  patient at the same time every morning, including weekends. Daytime naps are not allowed.        GO! to Sleep Instructions     Your health care provider has recommended GO! to Sleep, a six-week online program  developed by experts at the [facility]. GO! to Sleep follows a similar treatment plan to  those used at the [facility] and other [facility] sleep clinics.  How It Works   The program uses proven methods to help you improve your sleep from the comfort and privacy  of your own home. A recent study in the journal SLEEP found that 81 percent of patients who  completed a five-week online program for insomnia reported improvement in sleep.    What You Get   Starting in Lesson 1, you will begin keeping a daily online sleep log. Each day after you fill out  your log, you will get a sleep score. Based on your sleep log information and sleep score, you  will receive individualized feedback and daily sleep-improvement recommendations.  In addition, you will learn the basic science of sleep and why certain behaviors can be  detrimental to your sleep. You will also be given activities to help you get the sleep you need,  educational articles to help you get the most out of the program, motivational tips, and  personalized progress charts. Plus, you will receive six specially crafted audio relaxation  practices that will help you manage stress and improve your sleep.    Who It's For:   GO! to Sleep was designed for those experiencing short-term insomnia (development of  insomnia in the past one to six months) as well as people with ongoing difficulties with insomnia  (six months or longer). If you use sleep medication on an occasional basis, the program will help  you learn techniques that will likely help you sleep better without medication.  Although GO! to Sleep was not designed for people who take prescription sleep medications on  a nightly  basis, you can follow the program while on sleep medication and learn tools to help  you discontinue medication use. However, the program is not intended to help you implement a  plan specifically to taper off your medication. If you are looking for this type of help, discuss this  with your prescribing provider and/or ask for a referral to the [facility] Disorders  Center for further assessment and an individualized treatment plan. Additionally, if you feel that  depression, anxiety or a recent traumatic event may be causing or contributing to your poor  sleep, you may want to consult with a mental health professional. The Center offers sleep   specialists who can help patients taper off medications as well as  mental health experts who can help with contributing factors of insomnia.   To register for GO! to Sleep, visit [email or web address]

## 2024-12-19 NOTE — PROGRESS NOTES
" Patient: Lauren Cornell    [id number]  : 1960 -- AGE 64 y.o.    Provider: Scott Cody MD     [facility]   Service Date: 2024              [facility]  New Visit Note          The patient's referring provider is: Paz Beckett PA-C    HPI:  Lauren Cornell is a 64 y.o. female with insomnia and RLS/PLMS with PMH notable for ANGELICA previously on CPAP, migraine, obesity s/p bariatric surgery, carpal tunnel syndrome, overactive bladder, GERD, vitamin B12 deficiency, who presents today for management of insomnia.    Per chart review, she has had sleep onset insomnia \"forever\". Prescribed hydroxyzine 25 mg at night for anxiety, which probably did help. Today, she states that she does not recall why she stopped taking it. Other meds tried as below.    Recently has found it helpful to have an edible to sleep well.    Goes up to bed around 11 pm, watches TV or plays on her tablet. Falls asleep unintentionally after a few hours. Wakes up a couple times in the night, such as to urinate. Wakes up at 530 am. No napping in the daytime because napping makes it hard to sleep at night. Occasional unintentional dozing when idle.     Prior meds tried:  -Trazodone up to 200 mg made her feel groggy the next day  -Ambien 5 mg and higher doses were too strong  -Lunesta 3 mg was helpful  -gabapentin - does not recall how this affected her years ago  -melatonin up to 20 mg - ineffective    History of sleep apnea, used CPAP, lost 160 lbs 11 ago s/p gastric sleeve. Had nasal septum nasal deviation correction.    Declines doing another sleep study to evaluate for sleep apnea, not even a home sleep study.        NIGHTTIME SYMPTOMS:   Snoring: occasional, rated 4/10 in severity  Witnessed apnea: No  Nocturnal gasping: No  Nocturnal choking: No  Sleep walking: No  Sleep talking:  No  Dream enactment: No  Bruxism: infrequently  Nocturnal excessive sweating: No  Nocturnal " GERD: frequent  Morning headaches: infrequent  Morning dry mouth/sore throat: infrequent dry mouth  Nocturia: yes  Sleep paralysis: rare and feels like someone is in her room  Hypnagogic/hypnopompic hallucinations: No    DAYTIME SYMPTOMS  Vernon: 14/24  Insomnia severity index: 19/28  Daytime sleepiness: yes, if she sits still  Fatigue: Yes  Trouble with memory/concentration: No  Feeling sleepy while driving: Denies    RLS symptoms: difficult to obtain history about her symptoms. She often moves her legs. Does not describe an uncomfortable urge to move her legs    Cataplexy: No    SLEEP HABITS:   Preferred sleep position: side or reclined  Bedtime: 11 pm, sleep latency at 2 am  Wakes 2-3 times per night  Wake time: 530 am  Napping: no.     PRIOR SLEEP STUDIES:  -PSG 6/15/2005: 45 obstructive hypopneas for RDI of 7/h. 99% of time spent with SpO2 >90%. 223 periodic limb movements with associated arousal index of 6/h.  -CPAP titration 1/23/2008: titrated from 4-8 cm H2O. Rare respiratory event at 8 cm H2O, including during REM. PLMS controlled.   -CPAP titration 3/9/2009: weight 88.9 kg, BMI 31.6. Optimal pressure was 8 cm H2O, at which the RDI was 3.   -CPAP titration 7/23/2010: titrated from 6-13 cm H2O. Controlled at 12 cm H2O. SpO2 cl during sleep 90%. PLMS index 42/h. Delayed REM onset at 332 min. Sleep efficiency 86%.   -PSG 2/21/2014: weight 130 lbs. Mild ANGELICA with AHI 5, REM AHI 8.9, supine AHI 9.1, SpO2 cl 90%      All of the above reports were reviewed personally by me.      Patient Active Problem List   Diagnosis    Reyes's esophagus    Chronic pain of both knees    Carpal tunnel syndrome    GERD (gastroesophageal reflux disease)    Insomnia    Migraine headache    OAB (overactive bladder)    Psoriasis    Restless legs syndrome    Vitamin B12 deficiency    Mixed stress and urge urinary incontinence    Health care maintenance    Osteopenia     Past Medical History:   Diagnosis Date    Abdominal  distension (gaseous) 2020    Abdominal bloating    Acute bronchitis 2023    Adverse effect of anesthesia     PT STATES TAKES MORE TO PUT UNDER.    Arthritis knee    Arthritis of both knees 2023    Benzodiazepine causing adverse effect in therapeutic use, subsequent encounter 2023    Body mass index (BMI) 22.0-22.9, adult 2021    Body mass index (BMI) of 22.0 to 22.9 in adult    Carbuncle, unspecified 2014    Recurrent boils    Cervical cancer 2024    Contact with and (suspected) exposure to other bacterial communicable diseases 2014    MRSA exposure    Cutaneous abscess of limb, unspecified 10/08/2014    Abscess of axilla    Dental disease     I need 5 teeth pulled they are rotten    Encounter for screening for infections with a predominantly sexual mode of transmission 2016    Screening for STD (sexually transmitted disease)    GERD (gastroesophageal reflux disease)     Years    Headache     Hoarseness of voice 2023    Medial epicondylitis, unspecified elbow 2015    Medial epicondylitis    Muscle pain 2023    Nausea 10/27/2014    Nausea    Nephrolithiasis 2023    Neuropraxia of median nerve 2023    Nutritional deficiency, unspecified 2014    Nutrition deficiency due to insufficient food    Obstructive sleep apnea syndrome 2023    not current, resolved after gastric bypass surgery with weight loss    Other conditions influencing health status 2016    History of cough    Pain of left heel 2023    Personal history of diseases of the skin and subcutaneous tissue 2018    History of impetigo    Personal history of other complications of pregnancy, childbirth and the puerperium     History of spontaneous     Personal history of other diseases of the respiratory system 10/07/2015    History of sinusitis    Personal history of other diseases of the respiratory system 2022    History of acute  bronchitis    Personal history of other drug therapy 11/28/2016    History of influenza vaccination    Personal history of other infectious and parasitic diseases 07/18/2018    History of herpes zoster    Personal history of other infectious and parasitic diseases 02/26/2018    History of athlete's foot    Personal history of other medical treatment 10/08/2014    History of screening mammography    Personal history of other medical treatment 10/03/2016    History of screening mammography    Personal history of other specified conditions 02/04/2020    History of abdominal pain    Personal history of other specified conditions 10/16/2019    History of dysuria    Personal history of other specified conditions 08/03/2020    History of abnormal weight loss    Personal history of other specified conditions 06/19/2015    History of diarrhea    Personal history of other specified conditions 06/09/2015    History of fatigue    Personal history of other specified conditions 02/26/2018    History of insomnia    Personal history of urinary calculi 10/16/2019    History of renal calculi    Personal history of urinary calculi     History of kidney stones    Psoriasis 04/24/2023    Urinary tract infection, site not specified 06/22/2015    UTI (lower urinary tract infection)     Past Surgical History:   Procedure Laterality Date    BREAST SURGERY  10/08/2014    Breast Surgery    CHOLECYSTECTOMY      COSMETIC SURGERY      GALLBLADDER SURGERY  10/08/2014    Gallbladder Surgery    NOSE SURGERY  01/08/2016    Nose Surgery    SINUS SURGERY      STOMACH SURGERY  10/08/2014    Gastric Surgery     Current Outpatient Medications   Medication Sig Dispense Refill    acetaminophen (Tylenol) 325 mg tablet Take 3 tablets (975 mg) by mouth every 6 hours. 60 tablet 0    docusate sodium (Colace) 100 mg capsule Take 1 capsule (100 mg) by mouth 2 times a day as needed for constipation. 30 capsule 5    famotidine (Pepcid) 40 mg tablet Take 1 tablet  (40 mg) by mouth once daily at bedtime. 90 tablet 1    hydrOXYzine HCL (Atarax) 25 mg tablet TAKE 1 TABLET BY MOUTH AS NEEDED AT BEDTIME FOR ANXIETY/INSOMNIA 90 tablet 3    hylan (Synvisc-One) 48 mg/6 mL injection Inject one syringe (48mg/6mL) into each knee one time. 12 mL 0    ibuprofen 600 mg tablet Take 1 tablet (600 mg) by mouth every 6 hours if needed for mild pain (1 - 3). 60 tablet 0    ondansetron ODT (Zofran-ODT) 4 mg disintegrating tablet Take 1 tablet (4 mg) by mouth every 8 hours if needed for nausea or vomiting (migraine related N/V). 9 tablet 11    oxybutynin XL (Ditropan-XL) 15 mg 24 hr tablet Take 1 tablet (15 mg) by mouth once daily. 30 tablet 11    oxyCODONE (Roxicodone) 5 mg immediate release tablet Take 1 tablet (5 mg) by mouth every 6 hours if needed for severe pain (7 - 10). 15 tablet 0    pantoprazole (ProtoNix) 40 mg EC tablet Take 1 tablet (40 mg) by mouth 2 times a day. 180 tablet 3    rizatriptan MLT (Maxalt-MLT) 10 mg disintegrating tablet Take 1 tablet (10 mg) by mouth 1 time if needed for migraine. May repeat in 2 hours if unresolved. Do not exceed 30 mg in 24 hours. 9 tablet 11    SUMAtriptan (Imitrex) 20 mg/actuation nasal spray Administer 1 spray (20 mg) into one nostril once daily as needed for migraine (may repeat ONCE for total of 40mg in 24 hours) for up to 1 dose. 9 each 11    gabapentin (Neurontin) 100 mg capsule Take 1 capsule (100 mg) by mouth every 8 hours if needed (pain) for up to 2 days. 6 capsule 0     No current facility-administered medications for this visit.     No Known Allergies      Family History   Problem Relation Name Age of Onset    Diabetes Mother Ivanna     Alcohol abuse Father Lund Sr Lung & bone Cancer     Cancer Father Lund Sr Lung & bone Cancer     Lung cancer Father Lund Sr Lung & bone Cancer     Diabetes Sister Cammie Foster Cancer     Ovarian cancer Sister Cammie Brest Cancer     Cancer Sister Cammie Brest Cancer     Miscarriages / Stillbirths  Sister Cammie Brest Cancer     Breast cancer Sister Cammie Foster Cancer     Breast cancer Sister      Breast cancer Sister      Cancer Brother Franklyn Brain Cancer     Brain cancer Brother Franklyn Brain Cancer     Cancer Other multiple family members     Diabetes Other multiple family members     Alcohol abuse Brother Ford Lung & Bone cancer     Cancer Brother Ford Lung & Bone cancer     Cancer Sister Rocio Brest Cancer     Miscarriages / Stillbirths Sister Rocio Foster Cancer     Breast cancer Sister Rocio Foster Cancer     Cancer Sister Nina cervical cancer     Miscarriages / Stillbirths Sister Nina cervical cancer     Cancer Brother Gerald Throat & Lung Cancer     Diabetes Brother Jamal     Miscarriages / Stillbirths Sister Erin     Miscarriages / Stillbirths Sister Gena     Skin cancer Brother Gerald        SOCIAL HISTORY  Employment: special needs aide  Lives with:   Alcohol: one drink/month  Cigarettes: only when she was young  Illicits: edibles for sleep  Caffeine: throughout the day. Does not find that drinking it affects her sleep  Exercise: lots of stairs at work (school)     ROS: 12 point ROS positive for fatigue, heartburn at night, headaches, lightheaded/dizzy. All other systems and items were reviewed and are negative.    PHYSICAL EXAMINATION:   Vitals:    12/19/24 1347   BP: 115/69   BP Location: Left arm   Patient Position: Sitting   BP Cuff Size: Adult   Pulse: 88   SpO2: 96%   Weight: 63.1 kg (139 lb 3.2 oz)     Body mass index is 22.47 kg/m².  General: Awake. Alert. Comfortable. No apparent distress.   Speech: Normal  Comprehension: Normal  Mood: Stable  Affect: Appropriate  Eyes:   Eyelids: normal            ENT:          Nares patent bilaterally. Septum deviation absent. Washburn tongue position I. Tongue scalloping is not present, tongue is not enlarged, soft palate not elongated, hard palate is not high arched. Uvula is not enlarged. Retrognathia is not present. Tonsils are surgically absent.  Dentition good.           Neck:          Circumference: normal in caliber  Cardiac: Regular in rate and rhythm. No murmurs.   Pul:         Clear to auscultation bilaterally. Normal respiratory effort   Abd:         non-obese  Neuro: Alert, well-oriented. Cranial nerves II-XII grossly normal and symmetric.  Moves all limbs symmetrically with no evidence of significant focal weakness. No abnormal movements noted. Normal gait      LABS/DIAGNOSTICS:  Lab Results   Component Value Date    HGB 11.6 (L) 08/02/2024    CO2 27 07/26/2024    TSH 1.42 03/19/2024    VITD25 33 12/28/2023    [id number] 264 12/28/2023          ASSESSMENT AND PLAN: Ms. Lauren Cornell is a 64 y.o. female with a history of ANGELICA no longer on CPAP since losing a great deal of weight from bariatric surgery, long history of RLS/PLMS, and long history of insomnia. Per chart review she was on Requip CR 10 years ago.    #chronic insomnia - possible contribution from untreated sleep apnea (last study in 2014 when she weighed 130 lbs showed mild ANGELICA), with possible contribution from her PLMS and suboptimal sleep habits with falling asleep with her TV or tablet on  -sleep tips reviewed - stimulus control, relaxation techniques  -pt declines repeat sleep study  -advised pt to consider [facility]'s online CBT-I program, or referral to [facility] or [facility] for sleep psychologist  -sleep improved with edible (which pt knows I cannot officially recommend due to  policy)  -treat RLS/PLMS with ropinirole, see below    #PLMS  -start ropinirole 0.5 mg 1 h before bed, increase as needed and tolerated to max of 2 mg. Reviewed possible side effects including drowsiness, GI upset, compulsive behaviors      All of the above was discussed with the patient in detail.     51 minutes were spent on this encounter, including time reviewing the chart, conducting the H&P, counseling the patient, and documenting/placing orders.    FOLLOW UP:   months

## 2024-12-20 ENCOUNTER — LAB (OUTPATIENT)
Dept: LAB | Facility: LAB | Age: 64
End: 2024-12-20
Payer: COMMERCIAL

## 2024-12-20 DIAGNOSIS — R30.0 DYSURIA: ICD-10-CM

## 2024-12-20 LAB
APPEARANCE UR: CLEAR
BILIRUB UR STRIP.AUTO-MCNC: NEGATIVE MG/DL
COLOR UR: NORMAL
GLUCOSE UR STRIP.AUTO-MCNC: NORMAL MG/DL
HOLD SPECIMEN: NORMAL
KETONES UR STRIP.AUTO-MCNC: NEGATIVE MG/DL
LEUKOCYTE ESTERASE UR QL STRIP.AUTO: NEGATIVE
NITRITE UR QL STRIP.AUTO: NEGATIVE
PH UR STRIP.AUTO: 6.5 [PH]
PROT UR STRIP.AUTO-MCNC: NEGATIVE MG/DL
RBC # UR STRIP.AUTO: NEGATIVE /UL
SP GR UR STRIP.AUTO: 1.02
UROBILINOGEN UR STRIP.AUTO-MCNC: NORMAL MG/DL

## 2024-12-20 PROCEDURE — 81003 URINALYSIS AUTO W/O SCOPE: CPT

## 2025-01-03 ENCOUNTER — APPOINTMENT (OUTPATIENT)
Dept: SLEEP MEDICINE | Facility: CLINIC | Age: 65
End: 2025-01-03
Payer: COMMERCIAL

## 2025-01-23 ENCOUNTER — OFFICE VISIT (OUTPATIENT)
Dept: ORTHOPEDIC SURGERY | Facility: CLINIC | Age: 65
End: 2025-01-23
Payer: COMMERCIAL

## 2025-01-23 DIAGNOSIS — M17.0 PRIMARY OSTEOARTHRITIS OF BOTH KNEES: ICD-10-CM

## 2025-01-23 PROCEDURE — 99213 OFFICE O/P EST LOW 20 MIN: CPT | Performed by: STUDENT IN AN ORGANIZED HEALTH CARE EDUCATION/TRAINING PROGRAM

## 2025-01-23 PROCEDURE — L1812 KO ELASTIC W/JOINTS PRE OTS: HCPCS | Performed by: STUDENT IN AN ORGANIZED HEALTH CARE EDUCATION/TRAINING PROGRAM

## 2025-01-23 NOTE — PROGRESS NOTES
Sports Medicine Office Note    Today's Date:  01/23/2025     HPI: Lauren Cornell is a 64 y.o. *** who presents today for ***    ***    *** has no other complaints.    Physical Examination:     ***    Imaging:  Radiographs of the *** were reviewed and revealed ***.  The studies were reviewed by me personally in the office today.    === 11/01/24 ===    XR RIGHT THUMB, MIN 2 VIEWS    - Impression -  No evidence of fracture. Osteoarthritis 1st [facility].    Signed by: Nagi Parekh 11/1/2024 5:52 PM  Dictation workstation:   [id number]    Problem List Items Addressed This Visit    None  Visit Diagnoses         Codes    Primary osteoarthritis of both knees     M17.0    Relevant Orders    Knee Brace, Hinged          Assessment and Plan:    We reviewed the exam and imaging findings and discussed the conservative and surgical treatment options. We agreed ***. Physical therapy referral *** provided and discussed the importance of regular home exercises.  Recommended prescription doses of Tylenol and encouraged use of ice or heat as needed for acute flares of pain.  Plan for follow-up in *** for re-evaluation, otherwise may follow-up sooner if any new concerns arise.  Discussed this plan with the patient who is understanding and agreeable.    Patient was prescribed a Chapin-Pull Lite knee brace for left knee patellofemoral DJD with associated pain and instability. The patient is ambulatory with or without aid; but, has weakness, instability and/or deformity of their left knee which requires stabilization from this orthosis to improve their function.      Verbal and written instructions for the use, wear schedule, cleaning and application of this item were given.  Patient was instructed that should the brace result in increased pain, decreased sensation, increased swelling, or an overall worsening of their medical condition, to please contact our office immediately.     Orthotic management and training was provided for skin care,  modifications due to healing tissues, edema changes, interruption in skin integrity, and safety precautions with the orthosis.     **This note was dictated using Dragon speech recognition software and was not corrected for spelling or grammatical errors**.    Cayetano Low,   [facility]  [facility] [place]    home exercise program as tolerated.  Recommended prescription doses of Tylenol, Voltaren gel, and encouraged use of ice or heat as needed for acute flares of pain.  Plan for follow-up in 6-8 weeks for re-evaluation, otherwise may follow-up sooner if any new concerns arise.  Discussed this plan with the patient who is understanding and agreeable.    Patient was prescribed a Chapin-Pull Lite knee brace for left knee patellofemoral DJD with associated pain and instability. The patient is ambulatory with or without aid; but, has weakness, instability and/or deformity of their left knee which requires stabilization from this orthosis to improve their function.      Verbal and written instructions for the use, wear schedule, cleaning and application of this item were given.  Patient was instructed that should the brace result in increased pain, decreased sensation, increased swelling, or an overall worsening of their medical condition, to please contact our office immediately.     Orthotic management and training was provided for skin care, modifications due to healing tissues, edema changes, interruption in skin integrity, and safety precautions with the orthosis.     **This note was dictated using Dragon speech recognition software and was not corrected for spelling or grammatical errors**.    Cayetano Low DO  Primary Care Sports Medicine  [facility] [place]

## 2025-01-27 ENCOUNTER — APPOINTMENT (OUTPATIENT)
Dept: ORTHOPEDIC SURGERY | Facility: CLINIC | Age: 65
End: 2025-01-27
Payer: COMMERCIAL

## 2025-02-05 ENCOUNTER — APPOINTMENT (OUTPATIENT)
Dept: PRIMARY CARE | Facility: CLINIC | Age: 65
End: 2025-02-05
Payer: COMMERCIAL

## 2025-02-05 VITALS
BODY MASS INDEX: 22.08 KG/M2 | DIASTOLIC BLOOD PRESSURE: 84 MMHG | HEART RATE: 89 BPM | HEIGHT: 66 IN | OXYGEN SATURATION: 95 % | WEIGHT: 137.4 LBS | TEMPERATURE: 97.2 F | SYSTOLIC BLOOD PRESSURE: 119 MMHG

## 2025-02-05 DIAGNOSIS — E78.5 BORDERLINE HYPERLIPIDEMIA: ICD-10-CM

## 2025-02-05 DIAGNOSIS — Z12.31 SCREENING MAMMOGRAM FOR BREAST CANCER: ICD-10-CM

## 2025-02-05 DIAGNOSIS — G43.009 MIGRAINE WITHOUT AURA AND WITHOUT STATUS MIGRAINOSUS, NOT INTRACTABLE: Primary | ICD-10-CM

## 2025-02-05 DIAGNOSIS — E55.9 VITAMIN D INSUFFICIENCY: ICD-10-CM

## 2025-02-05 PROCEDURE — 99214 OFFICE O/P EST MOD 30 MIN: CPT | Performed by: PHYSICIAN ASSISTANT

## 2025-02-05 PROCEDURE — 3008F BODY MASS INDEX DOCD: CPT | Performed by: PHYSICIAN ASSISTANT

## 2025-02-05 PROCEDURE — 1036F TOBACCO NON-USER: CPT | Performed by: PHYSICIAN ASSISTANT

## 2025-02-05 RX ORDER — METHYLPREDNISOLONE 4 MG
1500 TABLET, DOSE PACK ORAL
COMMUNITY

## 2025-02-05 ASSESSMENT — PATIENT HEALTH QUESTIONNAIRE - PHQ9
1. LITTLE INTEREST OR PLEASURE IN DOING THINGS: NOT AT ALL
2. FEELING DOWN, DEPRESSED OR HOPELESS: NOT AT ALL
SUM OF ALL RESPONSES TO PHQ9 QUESTIONS 1 AND 2: 0

## 2025-02-05 NOTE — PROGRESS NOTES
Subjective     HPI   Lauren Cornell is a 64 y.o. year old female patient with presenting to clinic with concern for   Chief Complaint   Patient presents with    Follow-up     Discuss migraine pills- unsure if they are helping cause when she had one she took to many pills in a day  Insomnia still- sleep dr did not help her          BP Readings from Last 5 Encounters:   02/05/25 119/84   12/19/24 115/69   11/01/24 124/83   10/03/24 120/82   08/19/24 132/78     Dizziness spells. Specifically lightheaded on standing with headaches. Hx brother with brain cancer.   Poor hydration. Generally only drinks soda. Trying to drink flavored water now.  No orthostatic hypotension or tachycardia at bedside test.   Worsening migraines from baseline. Hs not had brain imaging in many years. Ordered MRA head    GERD  -pantoprazole bid  -famotidine at bedtime    Hx Gastric Bipass -2014  Body mass index is 22.18 kg/m².  -B12  -Calcium w Vit D    Anxiety  -hydroxyzine at night 25mg qhs     Overactive bladder  -ditropan 15xl     Migraines  -imitrex- used injectable and nasal sprays int he past dt need for abrupt intervention. Shots were necessary. -nasal spray and maxalt MDT options.      Insomnia  Dr Cody  -ropinerole 0.5mg (max 2mg prn)  Not helping. Has follow up appointment.    Osteopenia  -Calcium + Vit D (also post bariatric surgery)     Knee pain- OA   Dr Low   shots in knee for pain. Avoiding TKR.      Dental work  Had 6 teeth pulled bone grafts and implants scheduled      Cervical dysplasia  Dr Hernandez   8/1/24 Total hyster & BSO d/t high grade squamous lesions of cervix   SERGIO III (cervical intraepithelial neoplasia grade III) with severe dysplasia   By Dr DOREEN Che  Sister hx ovarian CA         Patient Active Problem List   Diagnosis    Reyes's esophagus    Chronic pain of both knees    Carpal tunnel syndrome    GERD (gastroesophageal reflux disease)    Insomnia    Migraine headache    OAB (overactive bladder)    Psoriasis     Restless legs syndrome    Vitamin B12 deficiency    Mixed stress and urge urinary incontinence    Health care maintenance    Osteopenia       Past Medical History:   Diagnosis Date    Abdominal distension (gaseous) 2020    Abdominal bloating    Acute bronchitis 2023    Adverse effect of anesthesia     PT STATES TAKES MORE TO PUT UNDER.    Arthritis knee    Arthritis of both knees 2023    Benzodiazepine causing adverse effect in therapeutic use, subsequent encounter 2023    Body mass index (BMI) 22.0-22.9, adult 2021    Body mass index (BMI) of 22.0 to 22.9 in adult    Carbuncle, unspecified 2014    Recurrent boils    Cervical cancer 2024    Contact with and (suspected) exposure to other bacterial communicable diseases 2014    MRSA exposure    Cutaneous abscess of limb, unspecified 10/08/2014    Abscess of axilla    Dental disease     I need 5 teeth pulled they are rotten    Encounter for screening for infections with a predominantly sexual mode of transmission 2016    Screening for STD (sexually transmitted disease)    GERD (gastroesophageal reflux disease)     Years    Headache     Hoarseness of voice 2023    Medial epicondylitis, unspecified elbow 2015    Medial epicondylitis    Muscle pain 2023    Nausea 10/27/2014    Nausea    Nephrolithiasis 2023    Neuropraxia of median nerve 2023    Nutritional deficiency, unspecified 2014    Nutrition deficiency due to insufficient food    Obstructive sleep apnea syndrome 2023    not current, resolved after gastric bypass surgery with weight loss    Other conditions influencing health status 2016    History of cough    Pain of left heel 2023    Personal history of diseases of the skin and subcutaneous tissue 2018    History of impetigo    Personal history of other complications of pregnancy, childbirth and the puerperium     History of spontaneous      Personal history of other diseases of the respiratory system 10/07/2015    History of sinusitis    Personal history of other diseases of the respiratory system 06/02/2022    History of acute bronchitis    Personal history of other drug therapy 11/28/2016    History of influenza vaccination    Personal history of other infectious and parasitic diseases 07/18/2018    History of herpes zoster    Personal history of other infectious and parasitic diseases 02/26/2018    History of athlete's foot    Personal history of other medical treatment 10/08/2014    History of screening mammography    Personal history of other medical treatment 10/03/2016    History of screening mammography    Personal history of other specified conditions 02/04/2020    History of abdominal pain    Personal history of other specified conditions 10/16/2019    History of dysuria    Personal history of other specified conditions 08/03/2020    History of abnormal weight loss    Personal history of other specified conditions 06/19/2015    History of diarrhea    Personal history of other specified conditions 06/09/2015    History of fatigue    Personal history of other specified conditions 02/26/2018    History of insomnia    Personal history of urinary calculi 10/16/2019    History of renal calculi    Personal history of urinary calculi     History of kidney stones    Psoriasis 04/24/2023    Urinary tract infection, site not specified 06/22/2015    UTI (lower urinary tract infection)      Past Surgical History:   Procedure Laterality Date    BREAST SURGERY  10/08/2014    Breast Surgery    CHOLECYSTECTOMY      COSMETIC SURGERY      GALLBLADDER SURGERY  10/08/2014    Gallbladder Surgery    NOSE SURGERY  01/08/2016    septoplasty    SINUS SURGERY      STOMACH SURGERY  10/08/2014    Gastric Surgery      Family History   Problem Relation Name Age of Onset    Diabetes Mother Ivanna     Alcohol abuse Father Lund Sr Lung & bone Cancer     Cancer Father Kevon Sr  Lung & bone Cancer     Lung cancer Father Lund Sr Lung & bone Cancer     Diabetes Sister Cammie Mariest Cancer     Ovarian cancer Sister Cammie Brest Cancer     Cancer Sister Cammie Brest Cancer     Miscarriages / Stillbirths Sister Cammie Brest Cancer     Breast cancer Sister Cammie Brest Cancer     Breast cancer Sister      Breast cancer Sister      Cancer Brother Franklyn Brain Cancer     Brain cancer Brother Franklyn Brain Cancer     Cancer Other multiple family members     Diabetes Other multiple family members     Alcohol abuse Brother Ford Lung & Bone cancer     Cancer Brother Ford Lung & Bone cancer     Cancer Sister Rocio Brest Cancer     Miscarriages / Stillbirths Sister Rocio Foster Cancer     Breast cancer Sister Rocio Foster Cancer     Cancer Sister Nina cervical cancer     Miscarriages / Stillbirths Sister Nina cervical cancer     Cancer Brother Gerald Throat & Lung Cancer     Diabetes Brother Jamal     Miscarriages / Stillbirths Sister Erin     Miscarriages / Stillbirths Sister Gena     Skin cancer Brother Gerald       Social History     Tobacco Use    Smoking status: Former     Types: Cigarettes     Passive exposure: Yes    Smokeless tobacco: Never    Tobacco comments:     not to date only when i was a kid   Substance Use Topics    Alcohol use: Yes     Comment: once in a while about 1 drink/month        Current Outpatient Medications:     acetaminophen (Tylenol) 325 mg tablet, Take 3 tablets (975 mg) by mouth every 6 hours., Disp: 60 tablet, Rfl: 0    CHONDROITIN SULFATE A ORAL, Take 1,200 mg by mouth., Disp: , Rfl:     docusate sodium (Colace) 100 mg capsule, Take 1 capsule (100 mg) by mouth 2 times a day as needed for constipation., Disp: 30 capsule, Rfl: 5    famotidine (Pepcid) 40 mg tablet, Take 1 tablet (40 mg) by mouth once daily at bedtime., Disp: 90 tablet, Rfl: 1    glucosamine sulfate (Glucosamine) 500 mg tablet, Take 1,500 mg by mouth., Disp: , Rfl:     hydrOXYzine HCL (Atarax) 25 mg tablet,  "TAKE 1 TABLET BY MOUTH AS NEEDED AT BEDTIME FOR ANXIETY/INSOMNIA, Disp: 90 tablet, Rfl: 3    hylan (Synvisc-One) 48 mg/6 mL injection, Inject one syringe (48mg/6mL) into each knee one time., Disp: 12 mL, Rfl: 0    ibuprofen 600 mg tablet, Take 1 tablet (600 mg) by mouth every 6 hours if needed for mild pain (1 - 3)., Disp: 60 tablet, Rfl: 0    ondansetron ODT (Zofran-ODT) 4 mg disintegrating tablet, Take 1 tablet (4 mg) by mouth every 8 hours if needed for nausea or vomiting (migraine related N/V)., Disp: 9 tablet, Rfl: 11    oxybutynin XL (Ditropan-XL) 15 mg 24 hr tablet, Take 1 tablet (15 mg) by mouth once daily., Disp: 30 tablet, Rfl: 11    pantoprazole (ProtoNix) 40 mg EC tablet, Take 1 tablet (40 mg) by mouth 2 times a day., Disp: 180 tablet, Rfl: 3    rOPINIRole (Requip) 1 mg tablet, Take half tab an hour bedtime. Increase by half a tab every 3 days as needed and tolerated for sleep. Max of 2 mg nightly, Disp: 120 tablet, Rfl: 0    rimegepant (Nurtec ODT) 75 mg tablet,disintegrating, Dissolve 1 tablet (75 mg) in the mouth once daily as needed (Take one tablet as needed for migraine. No repeat dosing. Do not take more than 18 doses in 30 days)., Disp: 9 tablet, Rfl: 11     Review of Systems  Constitutional: Denies fever  HEENT: Denies ST, earache  CVS: Denies Chest pain  Pulmonary: Denies wheezing, SOB  GI: Denies N/V  : Denies dysuria  Musculoskeletal:  Denies myalgia  Neuro: Denies focal weakness or numbness.  Skin: Denies Rashes.  *Review of Systems is negative unless otherwise mentioned in HPI or ROS above.    Objective   /84 (Patient Position: Standing)   Pulse 89   Temp 36.2 °C (97.2 °F)   Ht 1.676 m (5' 6\")   Wt 62.3 kg (137 lb 6.4 oz)   SpO2 95%   BMI 22.18 kg/m²  reviewed Body mass index is 22.18 kg/m².     Physical Exam  Constitutional: NAD.  Resting comfortably.  Head: Atraumatic, normocephalic.  ENT: Moist oral mucosa. Nasal mucosa wnl.   Cardiac: Regular rate & rhythm.   Pulmonary: " Lungs clear bilat  GI: Soft, Nontender, nondistended.   Musculoskeletal: No peripheral edema.   Skin: No evidence of trauma. No rashes  Psych: Intact judgement and insight.    .Assessment/Plan   Problem List Items Addressed This Visit             ICD-10-CM    Migraine headache - Primary G43.909    Relevant Medications    rimegepant (Nurtec ODT) 75 mg tablet,disintegrating    Other Relevant Orders    MR angio head w and wo IV contrast

## 2025-02-14 ENCOUNTER — TELEPHONE (OUTPATIENT)
Dept: PRIMARY CARE | Facility: CLINIC | Age: 65
End: 2025-02-14
Payer: COMMERCIAL

## 2025-02-14 NOTE — TELEPHONE ENCOUNTER
Supriya with [employer] called.  They do prior authorizations for [facility].  She is working on the PA for Dejah's MRA that was ordered.  She is wondering if there is a contraindication for a CTA.      If there is a contraindication or other reason why you would not order a CTA first.  Please fax her that information so she can continue with the MRA PA.      The case tracking ID is [id number]   Her fax number is [phone]

## 2025-02-15 ENCOUNTER — OFFICE VISIT (OUTPATIENT)
Dept: URGENT CARE | Age: 65
End: 2025-02-15
Payer: COMMERCIAL

## 2025-02-15 ENCOUNTER — ANCILLARY PROCEDURE (OUTPATIENT)
Dept: URGENT CARE | Age: 65
End: 2025-02-15
Payer: COMMERCIAL

## 2025-02-15 VITALS
HEIGHT: 66 IN | WEIGHT: 130 LBS | BODY MASS INDEX: 20.89 KG/M2 | TEMPERATURE: 97.7 F | SYSTOLIC BLOOD PRESSURE: 111 MMHG | DIASTOLIC BLOOD PRESSURE: 70 MMHG | OXYGEN SATURATION: 97 % | HEART RATE: 109 BPM | RESPIRATION RATE: 14 BRPM

## 2025-02-15 DIAGNOSIS — R05.9 COUGH, UNSPECIFIED TYPE: ICD-10-CM

## 2025-02-15 DIAGNOSIS — J01.00 ACUTE NON-RECURRENT MAXILLARY SINUSITIS: ICD-10-CM

## 2025-02-15 DIAGNOSIS — Z20.822 SUSPECTED COVID-19 VIRUS INFECTION: ICD-10-CM

## 2025-02-15 DIAGNOSIS — J02.9 SORE THROAT: ICD-10-CM

## 2025-02-15 DIAGNOSIS — J18.9 PNEUMONIA OF RIGHT LUNG DUE TO INFECTIOUS ORGANISM, UNSPECIFIED PART OF LUNG: Primary | ICD-10-CM

## 2025-02-15 LAB
POC RAPID INFLUENZA A: NEGATIVE
POC RAPID INFLUENZA B: NEGATIVE
POC RAPID STREP: NEGATIVE
POC SARS-COV-2 AG BINAX: NORMAL

## 2025-02-15 PROCEDURE — 71046 X-RAY EXAM CHEST 2 VIEWS: CPT

## 2025-02-15 RX ORDER — AZITHROMYCIN 250 MG/1
TABLET, FILM COATED ORAL
Qty: 6 TABLET | Refills: 0 | Status: SHIPPED | OUTPATIENT
Start: 2025-02-15 | End: 2025-02-20

## 2025-02-15 RX ORDER — AMOXICILLIN AND CLAVULANATE POTASSIUM 875; 125 MG/1; MG/1
875 TABLET, FILM COATED ORAL 2 TIMES DAILY
Qty: 14 TABLET | Refills: 0 | Status: SHIPPED | OUTPATIENT
Start: 2025-02-15 | End: 2025-02-22

## 2025-02-15 ASSESSMENT — ENCOUNTER SYMPTOMS
CHILLS: 0
DIARRHEA: 0
COUGH: 1
FATIGUE: 0
VOMITING: 0
EYE ITCHING: 0
CHEST TIGHTNESS: 0
ABDOMINAL PAIN: 0
SHORTNESS OF BREATH: 0
STRIDOR: 0
WHEEZING: 0
DIZZINESS: 0
SINUS PRESSURE: 0
EYE PAIN: 0
SORE THROAT: 1
EYE DISCHARGE: 0

## 2025-02-15 NOTE — PATIENT INSTRUCTIONS
Discharge instructions:    Please follow up with your Primary Care Physician within the next 5-7 days.  Primary care must repeat chest x-ray to assess for resolution.    It is important to take prescriptions as prescribed and complete all antibiotics.     Concern for potential pneumonia of right lower.    Will cover with dual therapy Augmentin and azithromycin.    If no improvement over 48 to 72 hours please return to clinic or follow-up in the emergency room.    If you develop any chest pain, shortness of breath or difficulty breathing please go the emergency room.    If your symptoms worsen you are instructed to immediately go to the emergency room for reevaluation and further assessment.    If you develop any chest pain, SOB, or difficulty breathing you are instructed to go to the emergency room for reevaluation.    All discharge instructions will be provided and explained to the patient at discharge.    If you have any questions regarding your treatment plan please call the [facility] urgent care clinic.

## 2025-02-15 NOTE — PROGRESS NOTES
"Subjective   Patient ID: Lauren Cornell \"Dejah\" is a 64 y.o. female. They present today with a chief complaint of Cough, Sore Throat, Nasal Congestion (X1 week), Fever, and Headache.    History of Present Illness  Patient is a 64-year-old female presenting to the clinic with complaints of cough, sore throat, nasal congestion.  Symptoms have been present for 1 week now.  Patient states sore throat she is unsure if it is from postnasal drip or from coughing.  Patient states cough productive of phlegm.  Prior history of smoking years ago when she was a teenager.  Only smoked for short period of time.  Patient states she has nasal congestion of the maxillary sinus.  No ear pain or ear drainage.  Patient would like evaluation.  Patient does not admit to any chest pain or shortness of breath.      History provided by:  Patient  Cough  Associated symptoms include postnasal drip and a sore throat. Pertinent negatives include no chest pain, chills, ear pain, shortness of breath or wheezing.   Sore Throat   Associated symptoms include congestion and coughing. Pertinent negatives include no abdominal pain, diarrhea, ear discharge, ear pain, shortness of breath, stridor or vomiting.   Fever   Associated symptoms include congestion, coughing and a sore throat. Pertinent negatives include no abdominal pain, chest pain, diarrhea, ear pain, vomiting or wheezing.   Headache  Associated symptoms: congestion, cough, drainage and sore throat    Associated symptoms: no abdominal pain, no diarrhea, no dizziness, no ear pain, no eye pain, no fatigue, no sinus pressure and no vomiting        Past Medical History  Allergies as of 02/15/2025    (No Known Allergies)       (Not in a hospital admission)       Past Medical History:   Diagnosis Date    Abdominal distension (gaseous) 02/04/2020    Abdominal bloating    Acute bronchitis 04/24/2023    Adverse effect of anesthesia     PT STATES TAKES MORE TO PUT UNDER.    Arthritis knee    " Arthritis of both knees 2023    Benzodiazepine causing adverse effect in therapeutic use, subsequent encounter 2023    Body mass index (BMI) 22.0-22.9, adult 2021    Body mass index (BMI) of 22.0 to 22.9 in adult    Carbuncle, unspecified 2014    Recurrent boils    Cervical cancer 2024    Contact with and (suspected) exposure to other bacterial communicable diseases 2014    MRSA exposure    Cutaneous abscess of limb, unspecified 10/08/2014    Abscess of axilla    Dental disease     I need 5 teeth pulled they are rotten    Encounter for screening for infections with a predominantly sexual mode of transmission 2016    Screening for STD (sexually transmitted disease)    GERD (gastroesophageal reflux disease)     Years    Headache     Hoarseness of voice 2023    Medial epicondylitis, unspecified elbow 2015    Medial epicondylitis    Muscle pain 2023    Nausea 10/27/2014    Nausea    Nephrolithiasis 2023    Neuropraxia of median nerve 2023    Nutritional deficiency, unspecified 2014    Nutrition deficiency due to insufficient food    Obstructive sleep apnea syndrome 2023    not current, resolved after gastric bypass surgery with weight loss    Other conditions influencing health status 2016    History of cough    Pain of left heel 2023    Personal history of diseases of the skin and subcutaneous tissue 2018    History of impetigo    Personal history of other complications of pregnancy, childbirth and the puerperium     History of spontaneous     Personal history of other diseases of the respiratory system 10/07/2015    History of sinusitis    Personal history of other diseases of the respiratory system 2022    History of acute bronchitis    Personal history of other drug therapy 2016    History of influenza vaccination    Personal history of other infectious and parasitic diseases 2018     History of herpes zoster    Personal history of other infectious and parasitic diseases 02/26/2018    History of athlete's foot    Personal history of other medical treatment 10/08/2014    History of screening mammography    Personal history of other medical treatment 10/03/2016    History of screening mammography    Personal history of other specified conditions 02/04/2020    History of abdominal pain    Personal history of other specified conditions 10/16/2019    History of dysuria    Personal history of other specified conditions 08/03/2020    History of abnormal weight loss    Personal history of other specified conditions 06/19/2015    History of diarrhea    Personal history of other specified conditions 06/09/2015    History of fatigue    Personal history of other specified conditions 02/26/2018    History of insomnia    Personal history of urinary calculi 10/16/2019    History of renal calculi    Personal history of urinary calculi     History of kidney stones    Psoriasis 04/24/2023    Urinary tract infection, site not specified 06/22/2015    UTI (lower urinary tract infection)       Past Surgical History:   Procedure Laterality Date    BREAST SURGERY  10/08/2014    Breast Surgery    CHOLECYSTECTOMY      COSMETIC SURGERY      GALLBLADDER SURGERY  10/08/2014    Gallbladder Surgery    NOSE SURGERY  01/08/2016    septoplasty    SINUS SURGERY      STOMACH SURGERY  10/08/2014    Gastric Surgery        reports that she has quit smoking. Her smoking use included cigarettes. She has been exposed to tobacco smoke. She has never used smokeless tobacco. She reports current alcohol use. She reports current drug use.    Review of Systems  Review of Systems   Constitutional:  Negative for chills and fatigue.   HENT:  Positive for congestion, postnasal drip and sore throat. Negative for ear discharge, ear pain and sinus pressure.    Eyes:  Negative for pain, discharge and itching.   Respiratory:  Positive for cough.  "Negative for chest tightness, shortness of breath, wheezing and stridor.    Cardiovascular:  Negative for chest pain.   Gastrointestinal:  Negative for abdominal pain, diarrhea and vomiting.   Neurological:  Negative for dizziness.   All other systems reviewed and are negative.                                 Objective    Vitals:    02/15/25 0854   BP: 111/70   BP Location: Left arm   Patient Position: Sitting   BP Cuff Size: Adult   Pulse: 109   Resp: 14   Temp: 36.5 °C (97.7 °F)   TempSrc: Oral   SpO2: 97%   Weight: 59 kg (130 lb)   Height: 1.676 m (5' 6\")     No LMP recorded. Patient has had a hysterectomy.    Physical Exam  Vitals reviewed.   Constitutional:       General: She is awake. She is not in acute distress.     Appearance: Normal appearance. She is well-developed. She is not ill-appearing or toxic-appearing.   HENT:      Head: Normocephalic and atraumatic.      Right Ear: Tympanic membrane, ear canal and external ear normal.      Left Ear: Tympanic membrane, ear canal and external ear normal.      Nose: Congestion present.      Mouth/Throat:      Mouth: Mucous membranes are moist.      Pharynx: Oropharynx is clear. Uvula midline. No oropharyngeal exudate or posterior oropharyngeal erythema.      Tonsils: No tonsillar exudate or tonsillar abscesses.   Cardiovascular:      Rate and Rhythm: Normal rate and regular rhythm.      Heart sounds: Normal heart sounds, S1 normal and S2 normal. No murmur heard.     No friction rub. No gallop.   Pulmonary:      Effort: Pulmonary effort is normal. No respiratory distress.      Breath sounds: Normal breath sounds. No stridor. No wheezing, rhonchi or rales.   Skin:     General: Skin is warm.   Neurological:      General: No focal deficit present.      Mental Status: She is alert and oriented to person, place, and time. Mental status is at baseline.      Gait: Gait is intact.   Psychiatric:         Mood and Affect: Mood normal.         Behavior: Behavior normal. " Behavior is cooperative.         Procedures    Point of Care Test & Imaging Results from this visit  Results for orders placed or performed in visit on 02/15/25   POCT rapid strep A manually resulted   Result Value Ref Range    POC Rapid Strep Negative Negative      No results found.    Diagnostic study results (if any) were reviewed by [facility].    Assessment/Plan   Allergies, medications, history, and pertinent labs/EKGs/Imaging reviewed by Bharat Donald PA-C.     Medical Decision Making:    Patient is a 64-year-old female presenting to the clinic with complaints of cough, sore throat, nasal congestion.  Symptoms have been present for 1 week now.  Patient states sore throat she is unsure if it is from postnasal drip or from coughing.  Patient states cough productive of phlegm.  Prior history of smoking years ago when she was a teenager.  Only smoked for short period of time.  Patient states she has nasal congestion of the maxillary sinus.  No ear pain or ear drainage.  Patient would like evaluation.  Patient does not admit to any chest pain or shortness of breath.  Vital signs in the clinic are stable.  Physical examination as above.  COVID, flu, strep testing negative.  Pending chest x-ray evaluation by radiology.  Chest x-ray interpreted by radiology as concern for potential atelectasis versus infiltrate of right lower.  Patient to be covered with dual therapy given symptoms and concern for pneumonia.  Augmentin and azithromycin.  Discharge instructions to follow. Discharge instructions: Please follow up with your Primary Care Physician within the next 5-7 days.  Primary care must repeat chest x-ray to assess for resolution. It is important to take prescriptions as prescribed and complete all antibiotics.  Concern for potential pneumonia of right lower. Will cover with dual therapy Augmentin and azithromycin. If no improvement over 48 to 72 hours please return to clinic or follow-up in the  emergency room. If you develop any chest pain, shortness of breath or difficulty breathing please go the emergency room. If your symptoms worsen you are instructed to immediately go to the emergency room for reevaluation and further assessment. If you develop any chest pain, SOB, or difficulty breathing you are instructed to go to the emergency room for reevaluation. All discharge instructions will be provided and explained to the patient at discharge. If you have any questions regarding your treatment plan please call the [facility] urgent care clinic.    Orders and Diagnoses  Diagnoses and all orders for this visit:  Suspected COVID-19 virus infection  -     POCT Covid-19 Rapid Antigen  Sore throat  -     POCT Influenza A/B manually resulted  -     POCT rapid strep A manually resulted  Cough, unspecified type  -     XR chest 2 views; Future      Medical Admin Record      Patient disposition: Home    Electronically signed by [facility]  9:35 AM

## 2025-02-16 DIAGNOSIS — G43.009 MIGRAINE WITHOUT AURA AND WITHOUT STATUS MIGRAINOSUS, NOT INTRACTABLE: Primary | ICD-10-CM

## 2025-02-17 ENCOUNTER — LAB (OUTPATIENT)
Dept: LAB | Facility: HOSPITAL | Age: 65
End: 2025-02-17
Payer: COMMERCIAL

## 2025-02-19 ENCOUNTER — TELEPHONE (OUTPATIENT)
Dept: PRIMARY CARE | Facility: CLINIC | Age: 65
End: 2025-02-19
Payer: COMMERCIAL

## 2025-02-19 NOTE — TELEPHONE ENCOUNTER
Went to urgent care on 02/15. She says that nothing tastes good. Only is able to eat popsicles. She is wondering if this is the medications that she is taking. Was given an abx and a steroid. She finished the steroid and has 2 days left of the abx. They did an a X-Ray and found some pneumonia. She was tested for Flu and Covid all negative.

## 2025-02-20 ENCOUNTER — TELEPHONE (OUTPATIENT)
Dept: PRIMARY CARE | Facility: CLINIC | Age: 65
End: 2025-02-20

## 2025-02-20 ENCOUNTER — APPOINTMENT (OUTPATIENT)
Dept: SLEEP MEDICINE | Facility: CLINIC | Age: 65
End: 2025-02-20
Payer: COMMERCIAL

## 2025-02-20 NOTE — TELEPHONE ENCOUNTER
Ct of the head is approved, need additional information for approval of CT neck.  What is the indication?  What is being ruled out?  What diagnosis?  Info needs faxed to [phone] in 3 days for approval.

## 2025-02-25 DIAGNOSIS — K21.9 GASTROESOPHAGEAL REFLUX DISEASE, UNSPECIFIED WHETHER ESOPHAGITIS PRESENT: ICD-10-CM

## 2025-02-25 RX ORDER — FAMOTIDINE 40 MG/1
40 TABLET, FILM COATED ORAL NIGHTLY
Qty: 90 TABLET | Refills: 1 | Status: SHIPPED | OUTPATIENT
Start: 2025-02-25 | End: 2025-08-24

## 2025-03-03 ENCOUNTER — APPOINTMENT (OUTPATIENT)
Dept: RADIOLOGY | Facility: HOSPITAL | Age: 65
End: 2025-03-03
Payer: COMMERCIAL

## 2025-03-04 ENCOUNTER — APPOINTMENT (OUTPATIENT)
Dept: RADIOLOGY | Facility: HOSPITAL | Age: 65
End: 2025-03-04
Payer: COMMERCIAL

## 2025-03-10 ENCOUNTER — APPOINTMENT (OUTPATIENT)
Dept: SLEEP MEDICINE | Facility: CLINIC | Age: 65
End: 2025-03-10
Payer: COMMERCIAL

## 2025-03-10 VITALS
WEIGHT: 130 LBS | HEIGHT: 66 IN | SYSTOLIC BLOOD PRESSURE: 114 MMHG | OXYGEN SATURATION: 98 % | HEART RATE: 90 BPM | BODY MASS INDEX: 20.89 KG/M2 | DIASTOLIC BLOOD PRESSURE: 78 MMHG

## 2025-03-10 DIAGNOSIS — F51.04 CHRONIC INSOMNIA: ICD-10-CM

## 2025-03-10 PROCEDURE — 3008F BODY MASS INDEX DOCD: CPT | Performed by: PSYCHIATRY & NEUROLOGY

## 2025-03-10 PROCEDURE — 1036F TOBACCO NON-USER: CPT | Performed by: PSYCHIATRY & NEUROLOGY

## 2025-03-10 PROCEDURE — 99215 OFFICE O/P EST HI 40 MIN: CPT | Performed by: PSYCHIATRY & NEUROLOGY

## 2025-03-10 RX ORDER — HYDROXYZINE HYDROCHLORIDE 25 MG/1
25 TABLET, FILM COATED ORAL NIGHTLY PRN
Qty: 90 TABLET | Refills: 0 | Status: SHIPPED | OUTPATIENT
Start: 2025-03-10

## 2025-03-10 NOTE — PROGRESS NOTES
" Patient: Lauren Cornell    [id number]  : 1960 -- AGE 64 y.o.    Provider: Scott Cody MD     [facility]   Service Date: 3/10/2025              [facility]  Follow-up Note        HPI: Lauren Cornell is a 64 y.o. female with insomnia and RLS/PLMS with PMH notable for ANGELICA previously on CPAP (lost 160 lbs from bariatric surgery), migraine, obesity s/p bariatric surgery, carpal tunnel syndrome, overactive bladder, GERD, vitamin B12 deficiency. She is here today for a follow up visit.     She has not noticed any change in her sleep since we started her on ropinirole, she went up to 1 mg nightly.     Prior meds tried for insomnia:  -Trazodone up to 200 mg made her feel groggy the next day  -Ambien 5 mg and higher doses were too strong  -Lunesta 3 mg was helpful for awhile, but then she became tolerant  -gabapentin - does not recall how this affected her in summer 2024 (prescribed for pain control). She may not have taken more than a few times because she healed quickly from surgery  -melatonin up to 20 mg - ineffective  -hydroxyzine 25 mg - helpful    On Dec 28 she got a smart watch that tracks her sleep. Reports only 1-2 minutes of snoring at night. Avg of 5-6 hours of sleep, rarely 7 hours. It does not report any problems with her heart rate. Last night her O2 dropped briefly to 86%.    Still feels she does not sleep well. Started turning off her TV sooner. Granddaughter (10 yo) sleeps in her bedroom with her less frequently now.    Wakes at least 2-3x/night. Sleeps with HOB elevated to help with acid reflux. Sleeps on side or supine, not prone.    Drowsy when she gets off the couch to go up to bed, but then feels alert when in bed. TV is on in the bedroom \"for noise\". Turns it off when she wakes up after falling asleep.    Coffee: reduced lately since having pneumonia and sinus infection a couple months ago, now having less coffee in the morning.    She " does not want to do another sleep study.    Willing to try hydroxyzine again. Open to trying gabapentin if needed, as well.    Had 6 teeth pulled and will be going back to getting dental implants.    No uncomfortable urge to move her legs at night, but legs like to move on their own when in bed.      Prior Sleep studies:   -PSG 6/15/2005: 45 obstructive hypopneas for RDI of 7/h. 99% of time spent with SpO2 >90%. 223 periodic limb movements with associated arousal index of 6/h.  -CPAP titration 1/23/2008: titrated from 4-8 cm H2O. Rare respiratory event at 8 cm H2O, including during REM. PLMS controlled.   -CPAP titration 3/9/2009: weight 88.9 kg, BMI 31.6. Optimal pressure was 8 cm H2O, at which the RDI was 3.   -CPAP titration 7/23/2010: titrated from 6-13 cm H2O. Controlled at 12 cm H2O. SpO2 cl during sleep 90%. PLMS index 42/h. Delayed REM onset at 332 min. Sleep efficiency 86%.   -PSG 2/21/2014: weight 130 lbs. Mild ANGELICA with AHI 5, REM AHI 8.9, supine AHI 9.1, SpO2 cl 90%              Patient Active Problem List   Diagnosis    Reyes's esophagus    Chronic pain of both knees    Carpal tunnel syndrome    GERD (gastroesophageal reflux disease)    Insomnia    Migraine headache    OAB (overactive bladder)    Psoriasis    Restless legs syndrome    Vitamin B12 deficiency    Mixed stress and urge urinary incontinence    Health care maintenance    Osteopenia     Past Medical History:   Diagnosis Date    Abdominal distension (gaseous) 02/04/2020    Abdominal bloating    Acute bronchitis 04/24/2023    Adverse effect of anesthesia     PT STATES TAKES MORE TO PUT UNDER.    Arthritis knee    Arthritis of both knees 04/24/2023    Benzodiazepine causing adverse effect in therapeutic use, subsequent encounter 08/22/2023    Body mass index (BMI) 22.0-22.9, adult 11/02/2021    Body mass index (BMI) of 22.0 to 22.9 in adult    Carbuncle, unspecified 11/30/2014    Recurrent boils    Cervical cancer April 2024    Contact  with and (suspected) exposure to other bacterial communicable diseases 2014    MRSA exposure    Cutaneous abscess of limb, unspecified 10/08/2014    Abscess of axilla    Dental disease     I need 5 teeth pulled they are rotten    Encounter for screening for infections with a predominantly sexual mode of transmission 2016    Screening for STD (sexually transmitted disease)    GERD (gastroesophageal reflux disease)     Years    Headache     Hoarseness of voice 2023    Medial epicondylitis, unspecified elbow 2015    Medial epicondylitis    Muscle pain 2023    Nausea 10/27/2014    Nausea    Nephrolithiasis 2023    Neuropraxia of median nerve 2023    Nutritional deficiency, unspecified 2014    Nutrition deficiency due to insufficient food    Obstructive sleep apnea syndrome 2023    not current, resolved after gastric bypass surgery with weight loss    Other conditions influencing health status 2016    History of cough    Pain of left heel 2023    Personal history of diseases of the skin and subcutaneous tissue 2018    History of impetigo    Personal history of other complications of pregnancy, childbirth and the puerperium     History of spontaneous     Personal history of other diseases of the respiratory system 10/07/2015    History of sinusitis    Personal history of other diseases of the respiratory system 2022    History of acute bronchitis    Personal history of other drug therapy 2016    History of influenza vaccination    Personal history of other infectious and parasitic diseases 2018    History of herpes zoster    Personal history of other infectious and parasitic diseases 2018    History of athlete's foot    Personal history of other medical treatment 10/08/2014    History of screening mammography    Personal history of other medical treatment 10/03/2016    History of screening mammography    Personal  history of other specified conditions 02/04/2020    History of abdominal pain    Personal history of other specified conditions 10/16/2019    History of dysuria    Personal history of other specified conditions 08/03/2020    History of abnormal weight loss    Personal history of other specified conditions 06/19/2015    History of diarrhea    Personal history of other specified conditions 06/09/2015    History of fatigue    Personal history of other specified conditions 02/26/2018    History of insomnia    Personal history of urinary calculi 10/16/2019    History of renal calculi    Personal history of urinary calculi     History of kidney stones    Psoriasis 04/24/2023    Urinary tract infection, site not specified 06/22/2015    UTI (lower urinary tract infection)     Past Surgical History:   Procedure Laterality Date    BREAST SURGERY  10/08/2014    Breast Surgery    CHOLECYSTECTOMY      COSMETIC SURGERY      GALLBLADDER SURGERY  10/08/2014    Gallbladder Surgery    NOSE SURGERY  01/08/2016    septoplasty    SINUS SURGERY      STOMACH SURGERY  10/08/2014    Gastric Surgery     Current Outpatient Medications on File Prior to Visit   Medication Sig Dispense Refill    acetaminophen (Tylenol) 325 mg tablet Take 3 tablets (975 mg) by mouth every 6 hours. 60 tablet 0    CHONDROITIN SULFATE A ORAL Take 1,200 mg by mouth.      docusate sodium (Colace) 100 mg capsule Take 1 capsule (100 mg) by mouth 2 times a day as needed for constipation. 30 capsule 5    famotidine (Pepcid) 40 mg tablet TAKE 1 TABLET (40 MG) BY MOUTH ONCE DAILY AT BEDTIME. 90 tablet 1    glucosamine sulfate (Glucosamine) 500 mg tablet Take 1,500 mg by mouth.      hydrOXYzine HCL (Atarax) 25 mg tablet TAKE 1 TABLET BY MOUTH AS NEEDED AT BEDTIME FOR ANXIETY/INSOMNIA 90 tablet 3    hylan (Synvisc-One) 48 mg/6 mL injection Inject one syringe (48mg/6mL) into each knee one time. 12 mL 0    ibuprofen 600 mg tablet Take 1 tablet (600 mg) by mouth every 6 hours if  "needed for mild pain (1 - 3). 60 tablet 0    ondansetron ODT (Zofran-ODT) 4 mg disintegrating tablet Take 1 tablet (4 mg) by mouth every 8 hours if needed for nausea or vomiting (migraine related N/V). 9 tablet 11    oxybutynin XL (Ditropan-XL) 15 mg 24 hr tablet Take 1 tablet (15 mg) by mouth once daily. 30 tablet 11    pantoprazole (ProtoNix) 40 mg EC tablet Take 1 tablet (40 mg) by mouth 2 times a day. 180 tablet 3    rimegepant (Nurtec ODT) 75 mg tablet,disintegrating Dissolve 1 tablet (75 mg) in the mouth once daily as needed (Take one tablet as needed for migraine. No repeat dosing. Do not take more than 18 doses in 30 days). 9 tablet 11    rOPINIRole (Requip) 1 mg tablet Take half tab an hour bedtime. Increase by half a tab every 3 days as needed and tolerated for sleep. Max of 2 mg nightly 120 tablet 0     No current facility-administered medications on file prior to visit.       PHYSICAL EXAMINATION:   Vitals:    03/10/25 1459   BP: 114/78   BP Location: Left arm   Pulse: 90   SpO2: 98%   Weight: 59 kg (130 lb)   Height: 1.676 m (5' 6\")     Body mass index is 20.98 kg/m².  General: Awake. Alert. Comfortable. No apparent distress.   Speech: Normal.  Comprehension: Normal.  Mood: Stable.  Affect: Appropriate.  Pul:         Normal respiratory effort.   Abd:        Non-obese  Neuro: Alert, well-oriented. Cranial nerves II-XII grossly normal and symmetric.  Moves all limbs symmetrically with no evidence of significant focal weakness. No abnormal movements noted. Normal gait      ASSESSMENT AND PLAN: Ms. Lauren oCrnell is a 64 y.o. female with chronic insomnia with history of ANGELICA, no longer on CPAP since losing a tremendous amount of weight. We tried ropinirole up to 1 mg for sleep to see if this would help with her sleep, but it made no difference. She has been working on improving her sleep habits, but I would still like for her to not watch TV in bed. She has leg movements at nighttime, but no " "uncomfortable urge to move them. There is a chance that taking hydroxyzine again may aggravate her leg movements, but she would prefer to try this over taking gabapentin for sleep due to risk of weight gain with gabapentin. Cannot rule out that mild ANGELICA is not impacting her sleep, either, but she is deferring a sleep study at this time.      #chronic insomnia  -start hydroxyzine 25-50 mg at bedtime - warned pt that this may aggravate leg movements  -consider gabapentin for sleep  -consider repeat home sleep study  -discontinue ropinirole  -for stimulus control-\"20 minute rule\" and pt to try to not watch TV in bed    All of the above was discussed with the patient in detail. She voiced an understanding of the above and was agreeable to proceed further as advised.     47 minutes were spent with the patient plus time spent reviewing the chart, updating the chart as needed, and documenting.     FOLLOW UP: 4-6 weeks  "

## 2025-03-11 ENCOUNTER — APPOINTMENT (OUTPATIENT)
Dept: RADIOLOGY | Facility: HOSPITAL | Age: 65
End: 2025-03-11
Payer: COMMERCIAL

## 2025-03-13 ENCOUNTER — OFFICE VISIT (OUTPATIENT)
Dept: ORTHOPEDIC SURGERY | Facility: CLINIC | Age: 65
End: 2025-03-13
Payer: COMMERCIAL

## 2025-03-13 VITALS — WEIGHT: 130 LBS | HEIGHT: 66 IN | BODY MASS INDEX: 20.89 KG/M2

## 2025-03-13 DIAGNOSIS — M17.0 PRIMARY OSTEOARTHRITIS OF BOTH KNEES: Primary | ICD-10-CM

## 2025-03-13 PROCEDURE — 3008F BODY MASS INDEX DOCD: CPT | Performed by: STUDENT IN AN ORGANIZED HEALTH CARE EDUCATION/TRAINING PROGRAM

## 2025-03-13 PROCEDURE — 99213 OFFICE O/P EST LOW 20 MIN: CPT | Performed by: STUDENT IN AN ORGANIZED HEALTH CARE EDUCATION/TRAINING PROGRAM

## 2025-03-13 ASSESSMENT — PAIN - FUNCTIONAL ASSESSMENT: PAIN_FUNCTIONAL_ASSESSMENT: NO/DENIES PAIN

## 2025-03-19 ENCOUNTER — TELEPHONE (OUTPATIENT)
Dept: SLEEP MEDICINE | Facility: CLINIC | Age: 65
End: 2025-03-19
Payer: COMMERCIAL

## 2025-03-19 DIAGNOSIS — D72.819 LEUKOPENIA, UNSPECIFIED TYPE: Primary | ICD-10-CM

## 2025-03-19 DIAGNOSIS — E55.9 VITAMIN D INSUFFICIENCY: ICD-10-CM

## 2025-03-19 DIAGNOSIS — E78.5 BORDERLINE HYPERLIPIDEMIA: ICD-10-CM

## 2025-03-19 NOTE — TELEPHONE ENCOUNTER
Patient called in  left a vmail about her pharmacy not receiving her script for Hydroxyzine. I returned her call and let her know that thepharmacy confirmed receipt of the prescription:    <<<<<  hydrOXYzine HCL (Atarax) 25 mg tablet        Sig: Take 1 tablet (25 mg) by mouth as needed at bedtime (sleep).        Sent to pharmacy as: hydrOXYzine HCL 25 mg tablet (Atarax)        Class: Normal        Route: oral        E-Prescribing Status: Receipt confirmed by pharmacy (3/10/2025  3:57 PM EDT)        >>>>    I suggested she call the pharmacy to see if there was an insurance issue holding up the fill.

## 2025-03-22 LAB
25(OH)D3+25(OH)D2 SERPL-MCNC: 37 NG/ML (ref 30–100)
ALBUMIN SERPL-MCNC: 4.2 G/DL (ref 3.6–5.1)
ALP SERPL-CCNC: 59 U/L (ref 37–153)
ALT SERPL-CCNC: 7 U/L (ref 6–29)
ANION GAP SERPL CALCULATED.4IONS-SCNC: 7 MMOL/L (CALC) (ref 7–17)
AST SERPL-CCNC: 12 U/L (ref 10–35)
BILIRUB SERPL-MCNC: 0.8 MG/DL (ref 0.2–1.2)
BUN SERPL-MCNC: 20 MG/DL (ref 7–25)
CALCIUM SERPL-MCNC: 9.4 MG/DL (ref 8.6–10.4)
CHLORIDE SERPL-SCNC: 106 MMOL/L (ref 98–110)
CHOLEST SERPL-MCNC: 198 MG/DL
CHOLEST/HDLC SERPL: 3.2 (CALC)
CO2 SERPL-SCNC: 29 MMOL/L (ref 20–32)
CREAT SERPL-MCNC: 0.63 MG/DL (ref 0.5–1.05)
EGFRCR SERPLBLD CKD-EPI 2021: 99 ML/MIN/1.73M2
ERYTHROCYTE [DISTWIDTH] IN BLOOD BY AUTOMATED COUNT: 13.1 % (ref 11–15)
GLUCOSE SERPL-MCNC: 93 MG/DL (ref 65–99)
HCT VFR BLD AUTO: 41.8 % (ref 35–45)
HDLC SERPL-MCNC: 61 MG/DL
HGB BLD-MCNC: 13.3 G/DL (ref 11.7–15.5)
LDLC SERPL CALC-MCNC: 122 MG/DL (CALC)
MCH RBC QN AUTO: 27.8 PG (ref 27–33)
MCHC RBC AUTO-ENTMCNC: 31.8 G/DL (ref 32–36)
MCV RBC AUTO: 87.4 FL (ref 80–100)
NONHDLC SERPL-MCNC: 137 MG/DL (CALC)
PLATELET # BLD AUTO: 284 THOUSAND/UL (ref 140–400)
PMV BLD REES-ECKER: 10.1 FL (ref 7.5–12.5)
POTASSIUM SERPL-SCNC: 4.2 MMOL/L (ref 3.5–5.3)
PROT SERPL-MCNC: 6.6 G/DL (ref 6.1–8.1)
RBC # BLD AUTO: 4.78 MILLION/UL (ref 3.8–5.1)
SODIUM SERPL-SCNC: 142 MMOL/L (ref 135–146)
TRIGL SERPL-MCNC: 56 MG/DL
TSH SERPL-ACNC: 1.48 MIU/L (ref 0.4–4.5)
WBC # BLD AUTO: 3.6 THOUSAND/UL (ref 3.8–10.8)

## 2025-03-26 ENCOUNTER — APPOINTMENT (OUTPATIENT)
Dept: OBSTETRICS AND GYNECOLOGY | Facility: CLINIC | Age: 65
End: 2025-03-26
Payer: COMMERCIAL

## 2025-03-27 ENCOUNTER — APPOINTMENT (OUTPATIENT)
Dept: RADIOLOGY | Facility: CLINIC | Age: 65
End: 2025-03-27
Payer: COMMERCIAL

## 2025-03-27 ENCOUNTER — APPOINTMENT (OUTPATIENT)
Dept: RADIOLOGY | Facility: HOSPITAL | Age: 65
End: 2025-03-27
Payer: COMMERCIAL

## 2025-03-28 ENCOUNTER — HOSPITAL ENCOUNTER (OUTPATIENT)
Dept: RADIOLOGY | Facility: HOSPITAL | Age: 65
Discharge: HOME | End: 2025-03-28
Payer: COMMERCIAL

## 2025-03-28 ENCOUNTER — APPOINTMENT (OUTPATIENT)
Dept: RADIOLOGY | Facility: HOSPITAL | Age: 65
End: 2025-03-28
Payer: COMMERCIAL

## 2025-03-28 VITALS — WEIGHT: 130 LBS | BODY MASS INDEX: 20.89 KG/M2 | HEIGHT: 66 IN

## 2025-03-28 DIAGNOSIS — Z12.31 SCREENING MAMMOGRAM FOR BREAST CANCER: ICD-10-CM

## 2025-03-28 DIAGNOSIS — G43.009 MIGRAINE WITHOUT AURA AND WITHOUT STATUS MIGRAINOSUS, NOT INTRACTABLE: ICD-10-CM

## 2025-03-28 PROCEDURE — 2550000001 HC RX 255 CONTRASTS: Performed by: PHYSICIAN ASSISTANT

## 2025-03-28 PROCEDURE — 70498 CT ANGIOGRAPHY NECK: CPT

## 2025-03-28 PROCEDURE — 77063 BREAST TOMOSYNTHESIS BI: CPT

## 2025-03-28 RX ADMIN — IOHEXOL 75 ML: 350 INJECTION, SOLUTION INTRAVENOUS at 12:11

## 2025-03-29 LAB
BASOPHILS # BLD AUTO: 33 CELLS/UL (ref 0–200)
BASOPHILS NFR BLD AUTO: 0.4 %
EOSINOPHIL # BLD AUTO: 91 CELLS/UL (ref 15–500)
EOSINOPHIL NFR BLD AUTO: 1.1 %
ERYTHROCYTE [DISTWIDTH] IN BLOOD BY AUTOMATED COUNT: 13.1 % (ref 11–15)
HCT VFR BLD AUTO: 41.9 % (ref 35–45)
HGB BLD-MCNC: 13.5 G/DL (ref 11.7–15.5)
LYMPHOCYTES # BLD AUTO: 1702 CELLS/UL (ref 850–3900)
LYMPHOCYTES NFR BLD AUTO: 20.5 %
MCH RBC QN AUTO: 27.9 PG (ref 27–33)
MCHC RBC AUTO-ENTMCNC: 32.2 G/DL (ref 32–36)
MCV RBC AUTO: 86.6 FL (ref 80–100)
MONOCYTES # BLD AUTO: 465 CELLS/UL (ref 200–950)
MONOCYTES NFR BLD AUTO: 5.6 %
NEUTROPHILS # BLD AUTO: 6009 CELLS/UL (ref 1500–7800)
NEUTROPHILS NFR BLD AUTO: 72.4 %
PLATELET # BLD AUTO: 316 THOUSAND/UL (ref 140–400)
PMV BLD REES-ECKER: 10.1 FL (ref 7.5–12.5)
RBC # BLD AUTO: 4.84 MILLION/UL (ref 3.8–5.1)
WBC # BLD AUTO: 8.3 THOUSAND/UL (ref 3.8–10.8)

## 2025-04-01 DIAGNOSIS — I67.1: Primary | ICD-10-CM

## 2025-04-02 NOTE — PROGRESS NOTES
Sports Medicine Office Note    Today's Date:  03/13/2025     HPI: Lauren Cornell is a 64 y.o. female with known history of bilateral knee OA who presents today for follow-up of chronic bilateral knee pain.     10/9/2024: She states she has had pain for several years and denies any associated injury/trauma. She previously followed with Dr. [place] and was getting viscosupplementation injections which have worked very well for her. She states last viscosupplementation injection with Durolane gave her roughly 1 year of relief, however pain has returned over the last 2 to 3 months without new injury. States pain is progressively worsening and occasionally has mild swelling in either knee. Denies any redness, warmth, numbness, tingling, weakness, or radiation of pain. She takes OTC Tylenol and ibuprofen as needed with slight improvement. She also wears compression knee sleeves with some relief. She is not following with any specific home exercise program, but does exercise regularly. She has tried cortisone injections into both of her knees previously which stopped providing relief. She is interested in repeating viscosupplementation injections if appropriate.      11/25/2024: She presents today for follow-up of chronic bilateral knee pain with DJD, and trial of repeat viscosupplementation injections with Synvisc-One.  She denies any interval injury/trauma.  Also denies any new swelling, redness, warmth, bruising, numbness, tingling, weakness, or radiation of pain.  She would like to proceed with viscosupplementation injections today.     1/23/2025: She presents today for follow-up of chronic bilateral knee pain s/p bilateral knee viscosupplementation injections with Synvisc-One performed on 11/25/2024.  She states she has had significant improvement in right knee pain following viscosupplementation injection, with slight improvement of left knee pain, though she still has pain in her left knee when going up or down  stairs.  She states left knee pain is primarily anterior.  Denies any interval injury/trauma or any new or worsening swelling, redness, warmth, bruising, radiation of pain, numbness, tingling, weakness.    3/13/2025: She presents today for follow-up of chronic bilateral knee pain s/p bilateral knee viscosupplementation injections with Synvisc-One performed on 11/25/2024.  She has been using patellofemoral offloader knee brace for left knee as needed for added comfort and stability.  She states her pain has significantly improved in both knees since previous evaluation, stating she has had 100% relief of her pain since then.  She denies any interval injury/trauma or any new or worsening swelling, redness, warmth, bruising, radiation of pain, numbness, tingling, weakness.    She has no other complaints.    Physical Examination:     The RIGHT knee has trace to grade 1 joint effusion. Patella crepitus and grind are positive. There is no tenderness to the medial and lateral joint lines. Flexion and extension are without mechanical blocking. There is no instability with stress testing.  The LEFT knee has trace to grade 1 joint effusion. Patella crepitus and grind are positive. There is no tenderness to the medial and lateral joint lines. Flexion and extension are without mechanical blocking. There is no instability with stress testing.  Skin - no rashes, sores, or open lesions. Strength, sensory and vascular exams are otherwise normal. There is no clubbing, cyanosis or edema.  Gait is minimally antalgic, pain-free, and tandem.    Imaging:  Radiographs of bilateral knees obtained on 5/11/2023 were reviewed and revealed mild to moderate DJD, most significant in patellofemoral compartment, without acute osseous abnormalities.     The studies were reviewed by me personally in the office today.    Problem List Items Addressed This Visit    None  Visit Diagnoses         Codes    Primary osteoarthritis of both knees    -   Primary M17.0          Assessment and Plan:    We reviewed the exam and imaging findings and discussed the conservative and surgical treatment options. We agreed to continue with conservative management of chronic bilateral knee pain with DJD as she is doing very well with current conservative management strategies.  Encouraged her to continue with regular home exercise program as tolerated.  Recommended prescription doses of Tylenol, Voltaren gel, and encouraged use of ice or heat as needed for acute flares of pain.  She may continue with knee brace with daily activity and exercise as needed for added comfort and stability.  Discussed with patient that we may consider repeating viscosupplementation injections once she is 6 months out from previous injections if she continues to get adequate pain relief and pain returns.  Plan for follow-up in 2-3 months as needed to discuss possibly repeating viscosupplementation injections, otherwise may follow-up sooner if any new concerns arise.  Discussed this plan with the patient who is understanding and agreeable.    **This note was dictated using Dragon speech recognition software and was not corrected for spelling or grammatical errors**.    Cayetano Low DO  Primary Care Sports Medicine  [facility] [place]    beer/wine/liquor

## 2025-04-08 ENCOUNTER — TELEPHONE (OUTPATIENT)
Dept: PRIMARY CARE | Facility: CLINIC | Age: 65
End: 2025-04-08
Payer: COMMERCIAL

## 2025-04-08 NOTE — TELEPHONE ENCOUNTER
Received CRM message stating that the patient declined scheduling for hematology because our office advised she does not need to schedule with them, please advise.

## 2025-04-23 ENCOUNTER — APPOINTMENT (OUTPATIENT)
Dept: PRIMARY CARE | Facility: CLINIC | Age: 65
End: 2025-04-23
Payer: COMMERCIAL

## 2025-04-23 VITALS
HEIGHT: 66 IN | BODY MASS INDEX: 21.02 KG/M2 | SYSTOLIC BLOOD PRESSURE: 100 MMHG | WEIGHT: 130.8 LBS | OXYGEN SATURATION: 97 % | DIASTOLIC BLOOD PRESSURE: 62 MMHG | TEMPERATURE: 97.9 F | HEART RATE: 94 BPM

## 2025-04-23 DIAGNOSIS — G57.62 MORTON NEUROMA OF LEFT FOOT: ICD-10-CM

## 2025-04-23 DIAGNOSIS — Z13.6 SCREENING FOR HEART DISEASE: ICD-10-CM

## 2025-04-23 DIAGNOSIS — J18.9 PNEUMONIA OF LOWER LOBE DUE TO INFECTIOUS ORGANISM, UNSPECIFIED LATERALITY: Primary | ICD-10-CM

## 2025-04-23 DIAGNOSIS — Z91.89 AT INCREASED RISK FOR CARDIOVASCULAR DISEASE: ICD-10-CM

## 2025-04-23 PROCEDURE — 1036F TOBACCO NON-USER: CPT | Performed by: PHYSICIAN ASSISTANT

## 2025-04-23 PROCEDURE — 3008F BODY MASS INDEX DOCD: CPT | Performed by: PHYSICIAN ASSISTANT

## 2025-04-23 PROCEDURE — 99214 OFFICE O/P EST MOD 30 MIN: CPT | Performed by: PHYSICIAN ASSISTANT

## 2025-04-23 ASSESSMENT — PATIENT HEALTH QUESTIONNAIRE - PHQ9
SUM OF ALL RESPONSES TO PHQ9 QUESTIONS 1 AND 2: 0
2. FEELING DOWN, DEPRESSED OR HOPELESS: NOT AT ALL
1. LITTLE INTEREST OR PLEASURE IN DOING THINGS: NOT AT ALL

## 2025-04-23 NOTE — PROGRESS NOTES
"Subjective     HPI   Lauren Cornell is a 64 y.o. year old female patient with presenting to clinic with concern for   Chief Complaint   Patient presents with    Follow-up     pneumonia    Results       Was seen in Urgent care/ States she was diagnosed with PNA   COVID, flu, strep testing negative.   Chest x-ray interpreted by radiology as concern for potential atelectasis versus infiltrate of right lower.   Treated with augmentin & Azithro      HLD  Lab Results   Component Value Date    LDLCALC 122 (H) 03/21/2025   -mediterranean diet    The 10-year ASCVD risk score (Josefina MEDINA, et al., 2019) is: 3%    Values used to calculate the score:      Age: 64 years      Sex: Female      Is Non- : No      Diabetic: No      Tobacco smoker: No      Systolic Blood Pressure: 100 mmHg      Is BP treated: No      HDL Cholesterol: 61 mg/dL      Total Cholesterol: 198 mg/dL    Left foot pain, concern for andrea's neuroma    Infundibulum, left supraclinoid internal carotid artery  Margarita Salvador CNP 5/8/25    Insomnia  -hydroxyine  Poor sleep hygiene discussed, habits  Problem List[1]    Medical History[2]   Surgical History[3]   Family History[4]   Social History     Tobacco Use    Smoking status: Former     Types: Cigarettes     Passive exposure: Yes    Smokeless tobacco: Never    Tobacco comments:     not to date only when i was a kid   Substance Use Topics    Alcohol use: Yes     Comment: once in a while about 1 drink/month      Current Medications[5]     Review of Systems  Constitutional: Denies fever  HEENT: Denies ST, earache  CVS: Denies Chest pain  Pulmonary: Denies wheezing, SOB  GI: Denies N/V  : Denies dysuria  Musculoskeletal:  Denies myalgia  Neuro: Denies focal weakness or numbness.  Skin: Denies Rashes.  *Review of Systems is negative unless otherwise mentioned in HPI or ROS above.    Objective   /62   Pulse 94   Temp 36.6 °C (97.9 °F)   Ht 1.676 m (5' 6\")   Wt 59.3 kg (130 lb " 12.8 oz)   SpO2 97%   BMI 21.11 kg/m²  reviewed Body mass index is 21.11 kg/m².     Physical Exam  Constitutional: NAD.  Resting comfortably.  Head: Atraumatic, normocephalic.  ENT: Moist oral mucosa. Nasal mucosa wnl.   Cardiac: Regular rate & rhythm.   Pulmonary: Lungs clear bilat  GI: Soft, Nontender, nondistended.   Musculoskeletal: No peripheral edema. Tenderness plantar foot left, 3-4th ball of foot  Skin: No evidence of trauma. No rashes  Psych: Intact judgement and insight.    .Assessment/Plan   Problem List Items Addressed This Visit    None  Visit Diagnoses         Codes      Pneumonia of lower lobe due to infectious organism, unspecified laterality    -  Primary J18.9    Relevant Orders    XR chest 2 views      At increased risk for cardiovascular disease     Z91.89    Relevant Orders    CT cardiac scoring wo IV contrast      Screening for heart disease     Z13.6    Relevant Orders    CT cardiac scoring wo IV contrast      Holley neuroma of left foot     G57.62    Relevant Orders    Referral to Podiatry                 [1]   Patient Active Problem List  Diagnosis    Reyes's esophagus    Chronic pain of both knees    Carpal tunnel syndrome    GERD (gastroesophageal reflux disease)    Insomnia    Migraine headache    OAB (overactive bladder)    Psoriasis    Restless legs syndrome    Vitamin B12 deficiency    Mixed stress and urge urinary incontinence    Health care maintenance    Osteopenia   [2]   Past Medical History:  Diagnosis Date    Abdominal distension (gaseous) 02/04/2020    Abdominal bloating    Acute bronchitis 04/24/2023    Adverse effect of anesthesia     PT STATES TAKES MORE TO PUT UNDER.    Arthritis knee    Arthritis of both knees 04/24/2023    Benzodiazepine causing adverse effect in therapeutic use, subsequent encounter 08/22/2023    Body mass index (BMI) 22.0-22.9, adult 11/02/2021    Body mass index (BMI) of 22.0 to 22.9 in adult    BRCA1 negative 2024    BRCA2 negative ?2024     Carbuncle, unspecified 2014    Recurrent boils    Cervical cancer 2024    Contact with and (suspected) exposure to other bacterial communicable diseases 2014    MRSA exposure    Cutaneous abscess of limb, unspecified 10/08/2014    Abscess of axilla    Dental disease     I need 5 teeth pulled they are rotten    Encounter for screening for infections with a predominantly sexual mode of transmission 2016    Screening for STD (sexually transmitted disease)    GERD (gastroesophageal reflux disease)     Years    Headache     Hoarseness of voice 2023    Medial epicondylitis, unspecified elbow 2015    Medial epicondylitis    Muscle pain 2023    Nausea 10/27/2014    Nausea    Nephrolithiasis 2023    Neuropraxia of median nerve 2023    Nutritional deficiency, unspecified 2014    Nutrition deficiency due to insufficient food    Obstructive sleep apnea syndrome 2023    not current, resolved after gastric bypass surgery with weight loss    Other conditions influencing health status 2016    History of cough    Ovarian cancer (Multi)     Pain of left heel 2023    Personal history of diseases of the skin and subcutaneous tissue 2018    History of impetigo    Personal history of other complications of pregnancy, childbirth and the puerperium     History of spontaneous     Personal history of other diseases of the respiratory system 10/07/2015    History of sinusitis    Personal history of other diseases of the respiratory system 2022    History of acute bronchitis    Personal history of other drug therapy 2016    History of influenza vaccination    Personal history of other infectious and parasitic diseases 2018    History of herpes zoster    Personal history of other infectious and parasitic diseases 2018    History of athlete's foot    Personal history of other medical treatment 10/08/2014    History of screening  mammography    Personal history of other medical treatment 10/03/2016    History of screening mammography    Personal history of other specified conditions 02/04/2020    History of abdominal pain    Personal history of other specified conditions 10/16/2019    History of dysuria    Personal history of other specified conditions 08/03/2020    History of abnormal weight loss    Personal history of other specified conditions 06/19/2015    History of diarrhea    Personal history of other specified conditions 06/09/2015    History of fatigue    Personal history of other specified conditions 02/26/2018    History of insomnia    Personal history of urinary calculi 10/16/2019    History of renal calculi    Personal history of urinary calculi     History of kidney stones    Psoriasis 04/24/2023    Urinary tract infection, site not specified 06/22/2015    UTI (lower urinary tract infection)   [3]   Past Surgical History:  Procedure Laterality Date    AUGMENTATION MAMMAPLASTY  5    BREAST SURGERY  10/08/2014    Breast Surgery    CHOLECYSTECTOMY      COSMETIC SURGERY      GALLBLADDER SURGERY  10/08/2014    Gallbladder Surgery    HYSTERECTOMY  08/01/2024    NOSE SURGERY  01/08/2016    septoplasty    SINUS SURGERY      STOMACH SURGERY  10/08/2014    Gastric Surgery   [4]   Family History  Problem Relation Name Age of Onset    Diabetes Mother Ivanna     Alcohol abuse Father Lund Sr Lung & bone Cancer     Cancer Father Lund Sr Lung & bone Cancer     Lung cancer Father Lund Sr Lung & bone Cancer     Diabetes Sister Cammie Brest Cancer     Ovarian cancer Sister Cammie Brest Cancer     Cancer Sister Cammie Brest Cancer     Miscarriages / Stillbirths Sister Cammie Brest Cancer     Breast cancer Sister Cammie Brest Cancer     Breast cancer Sister      Breast cancer Sister      Cancer Brother Franklyn Brain Cancer     Brain cancer Brother Franklyn Brain Cancer     Cancer Other multiple family members     Diabetes Other multiple family members      Alcohol abuse Brother Ford Lung & Bone cancer     Cancer Brother Ford Lung & Bone cancer     Cancer Sister Rocio Foster Cancer     Miscarriages / Stillbirths Sister Rocio Foster Cancer     Breast cancer Sister Rocio Foster Cancer     Cancer Sister Nina cervical cancer     Miscarriages / Stillbirths Sister Nina cervical cancer     Cancer Brother Gerald Throat & Lung Cancer     Diabetes Brother Jamal     Miscarriages / Stillbirths Sister Erin     Miscarriages / Stillbirths Sister Gena     Skin cancer Brother Gerald    [5]   Current Outpatient Medications:     acetaminophen (Tylenol) 325 mg tablet, Take 3 tablets (975 mg) by mouth every 6 hours., Disp: 60 tablet, Rfl: 0    CHONDROITIN SULFATE A ORAL, Take 1,200 mg by mouth., Disp: , Rfl:     docusate sodium (Colace) 100 mg capsule, Take 1 capsule (100 mg) by mouth 2 times a day as needed for constipation., Disp: 30 capsule, Rfl: 5    famotidine (Pepcid) 40 mg tablet, TAKE 1 TABLET (40 MG) BY MOUTH ONCE DAILY AT BEDTIME., Disp: 90 tablet, Rfl: 1    glucosamine sulfate (Glucosamine) 500 mg tablet, Take 1,500 mg by mouth., Disp: , Rfl:     hydrOXYzine HCL (Atarax) 25 mg tablet, Take 1 tablet (25 mg) by mouth as needed at bedtime (sleep)., Disp: 90 tablet, Rfl: 0    hylan (Synvisc-One) 48 mg/6 mL injection, Inject one syringe (48mg/6mL) into each knee one time., Disp: 12 mL, Rfl: 0    ibuprofen 600 mg tablet, Take 1 tablet (600 mg) by mouth every 6 hours if needed for mild pain (1 - 3)., Disp: 60 tablet, Rfl: 0    ondansetron ODT (Zofran-ODT) 4 mg disintegrating tablet, Take 1 tablet (4 mg) by mouth every 8 hours if needed for nausea or vomiting (migraine related N/V)., Disp: 9 tablet, Rfl: 11    oxybutynin XL (Ditropan-XL) 15 mg 24 hr tablet, Take 1 tablet (15 mg) by mouth once daily., Disp: 30 tablet, Rfl: 11    pantoprazole (ProtoNix) 40 mg EC tablet, Take 1 tablet (40 mg) by mouth 2 times a day., Disp: 180 tablet, Rfl: 3    rimegepant (Nurtec ODT) 75 mg  tablet,disintegrating, Dissolve 1 tablet (75 mg) in the mouth once daily as needed (Take one tablet as needed for migraine. No repeat dosing. Do not take more than 18 doses in 30 days)., Disp: 9 tablet, Rfl: 11

## 2025-04-27 DIAGNOSIS — N32.81 OVERACTIVE BLADDER: ICD-10-CM

## 2025-04-27 DIAGNOSIS — F51.04 CHRONIC INSOMNIA: ICD-10-CM

## 2025-04-28 RX ORDER — OXYBUTYNIN CHLORIDE 15 MG/1
15 TABLET, EXTENDED RELEASE ORAL DAILY
Qty: 90 TABLET | Refills: 3 | Status: SHIPPED | OUTPATIENT
Start: 2025-04-28

## 2025-04-29 NOTE — TELEPHONE ENCOUNTER
Michel Winslow, about 6 weeks ago I sent in a 90-day prescription, so before I send in a refill could you please call the patient to find out how much hydroxyzine she is taking at bedtime, how well it is helping her, and if she is experiencing any side effects. Thank you!

## 2025-05-02 RX ORDER — HYDROXYZINE HYDROCHLORIDE 25 MG/1
25 TABLET, FILM COATED ORAL NIGHTLY PRN
Qty: 90 TABLET | Refills: 0 | OUTPATIENT
Start: 2025-05-02

## 2025-05-06 ENCOUNTER — HOSPITAL ENCOUNTER (OUTPATIENT)
Dept: RADIOLOGY | Facility: HOSPITAL | Age: 65
Discharge: HOME | End: 2025-05-06
Payer: COMMERCIAL

## 2025-05-06 DIAGNOSIS — Z13.6 SCREENING FOR HEART DISEASE: ICD-10-CM

## 2025-05-06 DIAGNOSIS — Z91.89 AT INCREASED RISK FOR CARDIOVASCULAR DISEASE: ICD-10-CM

## 2025-05-06 PROCEDURE — 75571 CT HRT W/O DYE W/CA TEST: CPT

## 2025-05-08 ENCOUNTER — OFFICE VISIT (OUTPATIENT)
Dept: NEUROSURGERY | Facility: CLINIC | Age: 65
End: 2025-05-08
Payer: COMMERCIAL

## 2025-05-08 VITALS
WEIGHT: 130 LBS | DIASTOLIC BLOOD PRESSURE: 83 MMHG | HEIGHT: 65 IN | RESPIRATION RATE: 16 BRPM | BODY MASS INDEX: 21.66 KG/M2 | HEART RATE: 83 BPM | SYSTOLIC BLOOD PRESSURE: 127 MMHG

## 2025-05-08 DIAGNOSIS — I67.1: ICD-10-CM

## 2025-05-08 PROCEDURE — 3008F BODY MASS INDEX DOCD: CPT | Performed by: NURSE PRACTITIONER

## 2025-05-08 PROCEDURE — 99202 OFFICE O/P NEW SF 15 MIN: CPT | Performed by: NURSE PRACTITIONER

## 2025-05-08 PROCEDURE — 99212 OFFICE O/P EST SF 10 MIN: CPT | Performed by: NURSE PRACTITIONER

## 2025-05-08 ASSESSMENT — PAIN SCALES - GENERAL: PAINLEVEL_OUTOF10: 2

## 2025-05-08 NOTE — PROGRESS NOTES
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)  -     Referral to Neurosurgery          Provider Impression:         Medical History     Medical History[1]  Surgical History[2]  Social History[3]  Family History[4]  Allergies[5]  Current Outpatient Medications   Medication Instructions    acetaminophen (TYLENOL) 975 mg, oral, Every 6 hours    CHONDROITIN SULFATE A ORAL 1,200 mg    docusate sodium (COLACE) 100 mg, oral, 2 times daily PRN    famotidine (PEPCID) 40 mg, oral, Nightly    glucosamine sulfate (GLUCOSAMINE) 1,500 mg    hydrOXYzine HCL (ATARAX) 25 mg, oral, Nightly PRN    hylan (Synvisc-One) 48 mg/6 mL injection Inject one syringe (48mg/6mL) into each knee one time.    ibuprofen 600 mg, oral, Every 6 hours PRN    ondansetron ODT (ZOFRAN-ODT) 4 mg, oral, Every 8 hours PRN    oxyBUTYnin XL (DITROPAN-XL) 15 mg, oral, Daily    pantoprazole (PROTONIX) 40 mg, oral, 2 times daily    rimegepant (NURTEC ODT) 75 mg, oral, Daily PRN                              [1]   Past Medical History:  Diagnosis Date    Abdominal distension (gaseous) 02/04/2020    Abdominal bloating    Acute bronchitis 04/24/2023    Adverse effect of anesthesia     PT STATES TAKES MORE TO PUT UNDER.    Arthritis knee    Arthritis of both knees 04/24/2023    Benzodiazepine causing adverse effect in therapeutic use, subsequent encounter 08/22/2023    Body mass index (BMI) 22.0-22.9, adult 11/02/2021    Body mass index (BMI) of 22.0 to 22.9 in adult    BRCA1 negative 2024    BRCA2 negative ?2024    Carbuncle, unspecified 11/30/2014    Recurrent boils    Cervical cancer April 2024    Contact with and " (suspected) exposure to other bacterial communicable diseases 2014    MRSA exposure    Cutaneous abscess of limb, unspecified 10/08/2014    Abscess of axilla    Dental disease     I need 5 teeth pulled they are rotten    Encounter for screening for infections with a predominantly sexual mode of transmission 2016    Screening for STD (sexually transmitted disease)    GERD (gastroesophageal reflux disease)     Years    Headache     Hoarseness of voice 2023    Medial epicondylitis, unspecified elbow 2015    Medial epicondylitis    Muscle pain 2023    Nausea 10/27/2014    Nausea    Nephrolithiasis 2023    Neuropraxia of median nerve 2023    Nutritional deficiency, unspecified 2014    Nutrition deficiency due to insufficient food    Obstructive sleep apnea syndrome 2023    not current, resolved after gastric bypass surgery with weight loss    Other conditions influencing health status 2016    History of cough    Ovarian cancer (Multi)     Pain of left heel 2023    Personal history of diseases of the skin and subcutaneous tissue 2018    History of impetigo    Personal history of other complications of pregnancy, childbirth and the puerperium     History of spontaneous     Personal history of other diseases of the respiratory system 10/07/2015    History of sinusitis    Personal history of other diseases of the respiratory system 2022    History of acute bronchitis    Personal history of other drug therapy 2016    History of influenza vaccination    Personal history of other infectious and parasitic diseases 2018    History of herpes zoster    Personal history of other infectious and parasitic diseases 2018    History of athlete's foot    Personal history of other medical treatment 10/08/2014    History of screening mammography    Personal history of other medical treatment 10/03/2016    History of screening  mammography    Personal history of other specified conditions 2020    History of abdominal pain    Personal history of other specified conditions 10/16/2019    History of dysuria    Personal history of other specified conditions 2020    History of abnormal weight loss    Personal history of other specified conditions 2015    History of diarrhea    Personal history of other specified conditions 2015    History of fatigue    Personal history of other specified conditions 2018    History of insomnia    Personal history of urinary calculi 10/16/2019    History of renal calculi    Personal history of urinary calculi     History of kidney stones    Psoriasis 2023    Urinary tract infection, site not specified 2015    UTI (lower urinary tract infection)   [2]   Past Surgical History:  Procedure Laterality Date    AUGMENTATION MAMMAPLASTY  5    BREAST SURGERY  10/08/2014    Breast Surgery    CHOLECYSTECTOMY      COSMETIC SURGERY      GALLBLADDER SURGERY  10/08/2014    Gallbladder Surgery    HYSTERECTOMY  2024    NOSE SURGERY  2016    septoplasty    SINUS SURGERY      STOMACH SURGERY  10/08/2014    Gastric Surgery   [3]   Social History  Tobacco Use    Smoking status: Former     Current packs/day: 0.00     Types: Cigarettes     Quit date:      Years since quittin.3     Passive exposure: Yes    Smokeless tobacco: Never    Tobacco comments:     not to date only when i was a kid   Vaping Use    Vaping status: Never Used   Substance Use Topics    Alcohol use: Yes     Comment: once in a while about 1 drink/month    Drug use: Yes     Comment: edibles for sleep   [4]   Family History  Problem Relation Name Age of Onset    Diabetes Mother Ivanna     Alcohol abuse Father Lund Sr Lung & bone Cancer     Cancer Father Lund Sr Lung & bone Cancer     Lung cancer Father Lund Sr Lung & bone Cancer     Diabetes Sister Cammie Foster Cancer     Ovarian cancer Sister Cammie Foster  Cancer     Cancer Sister Cammie Foster Cancer     Miscarriages / Stillbirths Sister Cammie Foster Cancer     Breast cancer Sister Cammie Foster Cancer     Breast cancer Sister      Breast cancer Sister      Cancer Brother Franklyn Brain Cancer     Brain cancer Brother Franklyn Brain Cancer     Cancer Other multiple family members     Diabetes Other multiple family members     Alcohol abuse Brother Ford Lung & Bone cancer     Cancer Brother Ford Lung & Bone cancer     Cancer Sister Rocio Foster Cancer     Miscarriages / Stillbirths Sister Rocio Foster Cancer     Breast cancer Sister Rocio Foster Cancer     Cancer Sister Nina cervical cancer     Miscarriages / Stillbirths Sister Nina cervical cancer     Cancer Brother Gerald Throat & Lung Cancer     Diabetes Brother Jamal     Miscarriages / Stillbirths Sister [facility]     Miscarriages / Stillbirths Sister [facility]     Skin cancer Brother Gerald    [5] No Known Allergies     Stillbirths Sister Rocio Foster Cancer     Breast cancer Sister Rocio Foster Cancer     Cancer Sister Nina cervical cancer     Miscarriages / Stillbirths Sister Nina cervical cancer     Cancer Brother Gerald Throat & Lung Cancer     Diabetes Brother Jamal     Miscarriages / Stillbirths Sister Pat     Miscarriages / Stillbirths Sister Chriskatia     Skin cancer Brother Gerald    [5] No Known Allergies

## 2025-05-14 ENCOUNTER — APPOINTMENT (OUTPATIENT)
Dept: SLEEP MEDICINE | Facility: CLINIC | Age: 65
End: 2025-05-14
Payer: COMMERCIAL

## 2025-05-14 VITALS
BODY MASS INDEX: 22.17 KG/M2 | WEIGHT: 133.2 LBS | DIASTOLIC BLOOD PRESSURE: 79 MMHG | OXYGEN SATURATION: 93 % | HEART RATE: 103 BPM | SYSTOLIC BLOOD PRESSURE: 117 MMHG

## 2025-05-14 DIAGNOSIS — F51.04 CHRONIC INSOMNIA: ICD-10-CM

## 2025-05-14 DIAGNOSIS — G47.61 PERIODIC LIMB MOVEMENTS OF SLEEP: ICD-10-CM

## 2025-05-14 DIAGNOSIS — G47.33 OSA (OBSTRUCTIVE SLEEP APNEA): Primary | ICD-10-CM

## 2025-05-14 PROCEDURE — 1036F TOBACCO NON-USER: CPT | Performed by: PSYCHIATRY & NEUROLOGY

## 2025-05-14 PROCEDURE — 99213 OFFICE O/P EST LOW 20 MIN: CPT | Performed by: PSYCHIATRY & NEUROLOGY

## 2025-05-14 RX ORDER — GABAPENTIN 300 MG/1
300 CAPSULE ORAL NIGHTLY PRN
Qty: 60 CAPSULE | Refills: 0 | Status: SHIPPED | OUTPATIENT
Start: 2025-05-14 | End: 2026-05-14

## 2025-05-14 ASSESSMENT — PATIENT HEALTH QUESTIONNAIRE - PHQ9
2. FEELING DOWN, DEPRESSED OR HOPELESS: NOT AT ALL
1. LITTLE INTEREST OR PLEASURE IN DOING THINGS: NOT AT ALL
SUM OF ALL RESPONSES TO PHQ9 QUESTIONS 1 AND 2: 0

## 2025-05-14 NOTE — PROGRESS NOTES
Patient: Lauren Cornell    [id number]  : 1960 -- AGE 64 y.o.    Provider: Scott Cody MD     [facility]   Service Date: 2025              [facility]  Follow-up Note          HPI: Lauren Cornell is a 64 y.o. female with chronic insomnia and RLS/PLMS with PMH notable for ANGELICA previously on CPAP (lost 160 lbs from bariatric surgery. She is here today for a follow up visit.     Increased hydroxyzine from 25 to 50 mg at bedtime. Helps her fall asleep, but not stay asleep. Sleeps well the first few hours, then wakes up about every hour and checks the clock, which she tries to stop doing.      Had 6 teeth removed, will have 4 implants placed (already has 4 in place), will be getting the new teeth for a bridge on each side of her mouth in September. Open to possibly trying oral appliance for ANGELICA management at that time.    Mouth-breathes during sleep, so would not succeed with a nasal EPAP device. Does not want to tape her mouth closed at night.    Was prescribed gabapentin last summer after her surgery, but never took it because she recovered quickly. Open to trying gabapentin.    Agreeable to doing an updated sleep study as a home study to reassess her ANGELICA.      Prior Sleep studies:   -PSG 6/15/2005: 45 obstructive hypopneas for RDI of 7/h. 99% of time spent with SpO2 >90%. 223 periodic limb movements with associated arousal index of 6/h.  -CPAP titration 2008: titrated from 4-8 cm H2O. Rare respiratory event at 8 cm H2O, including during REM. PLMS controlled.   -CPAP titration 3/9/2009: weight 88.9 kg, BMI 31.6. Optimal pressure was 8 cm H2O, at which the RDI was 3.   -CPAP titration 2010: titrated from 6-13 cm H2O. Controlled at 12 cm H2O. SpO2 cl during sleep 90%. PLMS index 42/h. Delayed REM onset at 332 min. Sleep efficiency 86%.   -PSG 2014: weight 130 lbs. Mild ANGELICA with AHI 5, REM AHI 8.9, supine AHI 9.1, SpO2 cl  90%      Prior meds tried for insomnia:  -Trazodone up to 200 mg made her feel groggy the next day  -Ambien 5 mg and higher doses were too strong  -Lunesta 3 mg was helpful for awhile, but then she became tolerant  -gabapentin - does not recall how this affected her in summer 2024 (prescribed for pain control). She may not have taken more than a few times because she healed quickly from surgery  -melatonin up to 20 mg - ineffective  -hydroxyzine 50 mg - helpful, 50 mg more helpful for sleep onset, but not maintenance. 25 mg ineffective            Problem List[1]  Medical History[2]  Surgical History[3]  Medications Ordered Prior to Encounter[4]    PHYSICAL EXAMINATION:   Vitals:    05/14/25 1447   BP: 117/79   Pulse: 103   SpO2: 93%   Weight: 60.4 kg (133 lb 3.2 oz)     Body mass index is 22.17 kg/m².  General: Awake. Alert. Comfortable. No apparent distress.   Speech: Normal.  Comprehension: Normal.  Mood: Stable.  Affect: Appropriate.  Pul:         Normal respiratory effort.   Abd:         not obese  Neuro: Alert, well-oriented. Cranial nerves II-XII grossly normal and symmetric.  Moves all limbs symmetrically with no evidence of significant focal weakness. No abnormal movements noted. Normal gait      ASSESSMENT AND PLAN: Ms. Lauren Cornell is a 64 y.o. female with chronic insomnia and RLS/PLMS with PMH notable for ANGELICA previously on CPAP (lost 160 lbs from bariatric surgery. Her sleep onset is much improved with hydroxyzine at 50 mg at bedtime, but sleep maintenance is still an issue. Unclear if waking up from ANGELICA or from PLMS as possible reasons.      #ANGELICA - mild based on 2014 study, primarily when supine and in REM sleep  -re-evaluate with home sleep study  -consider oral appliance (after September's dental surgeries)    #chronic insomnia  -either from ANGELICA or PLMS  -start gabapentin 300-600 mg nightly as needed. Possible side effects discussed, including weight gain, peripheral edema, sedation  -okay to  continue hydroxyzine 50 mg at bedtime prn    #PLMS  -start gabapentin 300-600 mg nightly as needed. Possible side effects discussed, including weight gain, peripheral edema, sedation    All of the above was discussed with the patient in detail. She voiced an understanding of the above and was agreeable to proceed further as advised.     29 minutes were spent with the patient plus time spent reviewing the chart, updating the chart as needed, and documenting.     FOLLOW UP: 1-2 months         [1]   Patient Active Problem List  Diagnosis    Reyes's esophagus    Chronic pain of both knees    Carpal tunnel syndrome    GERD (gastroesophageal reflux disease)    Insomnia    Migraine headache    OAB (overactive bladder)    Psoriasis    Restless legs syndrome    Vitamin B12 deficiency    Mixed stress and urge urinary incontinence    Health care maintenance    Osteopenia   [2]   Past Medical History:  Diagnosis Date    Abdominal distension (gaseous) 02/04/2020    Abdominal bloating    Acute bronchitis 04/24/2023    Adverse effect of anesthesia     PT STATES TAKES MORE TO PUT UNDER.    Arthritis knee    Arthritis of both knees 04/24/2023    Benzodiazepine causing adverse effect in therapeutic use, subsequent encounter 08/22/2023    Body mass index (BMI) 22.0-22.9, adult 11/02/2021    Body mass index (BMI) of 22.0 to 22.9 in adult    BRCA1 negative 2024    BRCA2 negative ?2024    Carbuncle, unspecified 11/30/2014    Recurrent boils    Cervical cancer April 2024    Contact with and (suspected) exposure to other bacterial communicable diseases 11/25/2014    MRSA exposure    Cutaneous abscess of limb, unspecified 10/08/2014    Abscess of axilla    Dental disease     I need 5 teeth pulled they are rotten    Encounter for screening for infections with a predominantly sexual mode of transmission 11/28/2016    Screening for STD (sexually transmitted disease)    GERD (gastroesophageal reflux disease)     Years    Headache      Hoarseness of voice 2023    Medial epicondylitis, unspecified elbow 2015    Medial epicondylitis    Muscle pain 2023    Nausea 10/27/2014    Nausea    Nephrolithiasis 2023    Neuropraxia of median nerve 2023    Nutritional deficiency, unspecified 2014    Nutrition deficiency due to insufficient food    Obstructive sleep apnea syndrome 2023    not current, resolved after gastric bypass surgery with weight loss    Other conditions influencing health status 2016    History of cough    Ovarian cancer (Multi)     Pain of left heel 2023    Personal history of diseases of the skin and subcutaneous tissue 2018    History of impetigo    Personal history of other complications of pregnancy, childbirth and the puerperium     History of spontaneous     Personal history of other diseases of the respiratory system 10/07/2015    History of sinusitis    Personal history of other diseases of the respiratory system 2022    History of acute bronchitis    Personal history of other drug therapy 2016    History of influenza vaccination    Personal history of other infectious and parasitic diseases 2018    History of herpes zoster    Personal history of other infectious and parasitic diseases 2018    History of athlete's foot    Personal history of other medical treatment 10/08/2014    History of screening mammography    Personal history of other medical treatment 10/03/2016    History of screening mammography    Personal history of other specified conditions 2020    History of abdominal pain    Personal history of other specified conditions 10/16/2019    History of dysuria    Personal history of other specified conditions 2020    History of abnormal weight loss    Personal history of other specified conditions 2015    History of diarrhea    Personal history of other specified conditions 2015    History of fatigue     Personal history of other specified conditions 02/26/2018    History of insomnia    Personal history of urinary calculi 10/16/2019    History of renal calculi    Personal history of urinary calculi     History of kidney stones    Psoriasis 04/24/2023    Urinary tract infection, site not specified 06/22/2015    UTI (lower urinary tract infection)   [3]   Past Surgical History:  Procedure Laterality Date    AUGMENTATION MAMMAPLASTY  5    BREAST SURGERY  10/08/2014    Breast Surgery    CHOLECYSTECTOMY      COSMETIC SURGERY      GALLBLADDER SURGERY  10/08/2014    Gallbladder Surgery    HYSTERECTOMY  08/01/2024    NOSE SURGERY  01/08/2016    septoplasty    SINUS SURGERY      STOMACH SURGERY  10/08/2014    Gastric Surgery   [4]   Current Outpatient Medications on File Prior to Visit   Medication Sig Dispense Refill    acetaminophen (Tylenol) 325 mg tablet Take 3 tablets (975 mg) by mouth every 6 hours. 60 tablet 0    CHONDROITIN SULFATE A ORAL Take 1,200 mg by mouth.      docusate sodium (Colace) 100 mg capsule Take 1 capsule (100 mg) by mouth 2 times a day as needed for constipation. 30 capsule 5    famotidine (Pepcid) 40 mg tablet TAKE 1 TABLET (40 MG) BY MOUTH ONCE DAILY AT BEDTIME. 90 tablet 1    glucosamine sulfate (Glucosamine) 500 mg tablet Take 1,500 mg by mouth.      hydrOXYzine HCL (Atarax) 25 mg tablet Take 1 tablet (25 mg) by mouth as needed at bedtime (sleep). 90 tablet 0    hylan (Synvisc-One) 48 mg/6 mL injection Inject one syringe (48mg/6mL) into each knee one time. 12 mL 0    ibuprofen 600 mg tablet Take 1 tablet (600 mg) by mouth every 6 hours if needed for mild pain (1 - 3). 60 tablet 0    ondansetron ODT (Zofran-ODT) 4 mg disintegrating tablet Take 1 tablet (4 mg) by mouth every 8 hours if needed for nausea or vomiting (migraine related N/V). 9 tablet 11    oxybutynin XL (Ditropan-XL) 15 mg 24 hr tablet TAKE 1 TABLET BY MOUTH EVERY DAY 90 tablet 3    pantoprazole (ProtoNix) 40 mg EC tablet Take 1  tablet (40 mg) by mouth 2 times a day. 180 tablet 3    rimegepant (Nurtec ODT) 75 mg tablet,disintegrating Dissolve 1 tablet (75 mg) in the mouth once daily as needed (Take one tablet as needed for migraine. No repeat dosing. Do not take more than 18 doses in 30 days). 9 tablet 11     No current facility-administered medications on file prior to visit.

## 2025-05-29 ENCOUNTER — APPOINTMENT (OUTPATIENT)
Dept: ORTHOPEDIC SURGERY | Facility: CLINIC | Age: 65
End: 2025-05-29
Payer: COMMERCIAL

## 2025-06-03 ENCOUNTER — APPOINTMENT (OUTPATIENT)
Dept: HEMATOLOGY/ONCOLOGY | Facility: CLINIC | Age: 65
End: 2025-06-03
Payer: COMMERCIAL

## 2025-06-30 ENCOUNTER — APPOINTMENT (OUTPATIENT)
Dept: SLEEP MEDICINE | Facility: CLINIC | Age: 65
End: 2025-06-30
Payer: COMMERCIAL

## 2025-06-30 VITALS
HEART RATE: 70 BPM | DIASTOLIC BLOOD PRESSURE: 80 MMHG | SYSTOLIC BLOOD PRESSURE: 122 MMHG | WEIGHT: 144.5 LBS | BODY MASS INDEX: 24.67 KG/M2 | HEIGHT: 64 IN | OXYGEN SATURATION: 97 %

## 2025-06-30 DIAGNOSIS — G25.81 RLS (RESTLESS LEGS SYNDROME): ICD-10-CM

## 2025-06-30 DIAGNOSIS — F51.04 CHRONIC INSOMNIA: ICD-10-CM

## 2025-06-30 DIAGNOSIS — G47.33 OSA (OBSTRUCTIVE SLEEP APNEA): ICD-10-CM

## 2025-06-30 PROCEDURE — 1036F TOBACCO NON-USER: CPT | Performed by: PSYCHIATRY & NEUROLOGY

## 2025-06-30 PROCEDURE — 99215 OFFICE O/P EST HI 40 MIN: CPT | Performed by: PSYCHIATRY & NEUROLOGY

## 2025-06-30 PROCEDURE — 3008F BODY MASS INDEX DOCD: CPT | Performed by: PSYCHIATRY & NEUROLOGY

## 2025-06-30 RX ORDER — SULFAMETHOXAZOLE AND TRIMETHOPRIM 800; 160 MG/1; MG/1
TABLET ORAL EVERY 12 HOURS
COMMUNITY
Start: 2024-08-22 | End: 2025-06-30 | Stop reason: ALTCHOICE

## 2025-06-30 RX ORDER — OXYCODONE HYDROCHLORIDE 5 MG/1
5 TABLET ORAL
COMMUNITY
Start: 2024-12-20

## 2025-06-30 RX ORDER — AMOXICILLIN AND CLAVULANATE POTASSIUM 875; 125 MG/1; MG/1
1 TABLET, FILM COATED ORAL EVERY 12 HOURS
COMMUNITY
Start: 2025-05-02 | End: 2025-06-30 | Stop reason: ALTCHOICE

## 2025-06-30 RX ORDER — OXYCODONE HYDROCHLORIDE 5 MG/1
5 TABLET ORAL EVERY 6 HOURS PRN
COMMUNITY
Start: 2025-05-02

## 2025-06-30 RX ORDER — ALBUTEROL SULFATE 90 UG/1
1 INHALANT RESPIRATORY (INHALATION) EVERY 4 HOURS PRN
COMMUNITY

## 2025-06-30 RX ORDER — CHLORHEXIDINE GLUCONATE ORAL RINSE 1.2 MG/ML
SOLUTION DENTAL
COMMUNITY
Start: 2024-12-20 | End: 2025-06-30 | Stop reason: ALTCHOICE

## 2025-06-30 NOTE — PROGRESS NOTES
Patient: Lauren Cornell    [id number]  : 1960 -- AGE 64 y.o.    Provider: Scott Cody MD     [facility]   Service Date: 2025              [facility]  Follow-up Note      HPI: Lauren Cornell is a 64 y.o. female with chronic insomnia (sleep onset and sleep maintenance insomnia) on gabapentin and much improved with hydroxyzine PRN, with RLS/PLMS with PMH notable for ANGELICA previously on CPAP (lost 160 lbs from bariatric surgery). She is here today for a follow up visit. Last seen by me on 25, at which time we started her on gabapentin and ordered a home sleep study to reassess for sleep apnea.    Gabapentin 600 mg and hydroxyzine 25 mg, when taken together, help her sleep, but some mornings she wakes up feeling groggy for an hour.    Sleep study scheduled for July 15.    She is never drowsy when she gets into bed. Bedtime around 10 pm, takes her sleep aids then, can be asleep between 11 pm and 3 am. Wake time 7 am now, but during school months it is 530 am. No daytime napping.     Covers her clock so she cannot see the time in the middle of the night. Can have multiple brief awakenings in the night to reposition or the room is too hot and she turns. No nocturia.     Physical activity is down over the summer. Has gained 11 lbs since being on gabapentin (and reduced physical activity), which she does not like.    Some nights she is bothered by RLS when trying to fall asleep at the start of the night. No pattern. Not made worse with hydroxyzine.    1 up to 2 cups of coffee in the morning, usually just 1 cup.     Benadryl does not help her sleep. Other sleep aids that have been prescribed previously, including Ambien, etc. - morning grogginess. Melatonin 20 mg is not sedating.    There are 7 people living at her home, including her grandchildren. It is not easy for her to wait until she is drowsy before going up to bed because of being disrupted  by her grandchildren. They have no set bedtime in the summer time. The youngest, who is 9 years old and has been in the patient's care since the child was 6 months old, likes to be in the patient's bed at night and the child is getting bigger and takes up space in the bed and her movements disrupts the patient's sleep. Patient is try ing to encouraged her granddaughter to sleep in her own bed at night after they cuddle.    She keeps busy during the daytime and does multiple outdoor activities with her grandchildren.      Prior Sleep studies:   -PSG 6/15/2005: 45 obstructive hypopneas for RDI of 7/h. 99% of time spent with SpO2 >90%. 223 periodic limb movements with associated arousal index of 6/h.  -CPAP titration 1/23/2008: titrated from 4-8 cm H2O. Rare respiratory event at 8 cm H2O, including during REM. PLMS controlled.   -CPAP titration 3/9/2009: weight 88.9 kg, BMI 31.6. Optimal pressure was 8 cm H2O, at which the RDI was 3.   -CPAP titration 7/23/2010: titrated from 6-13 cm H2O. Controlled at 12 cm H2O. SpO2 cl during sleep 90%. PLMS index 42/h. Delayed REM onset at 332 min. Sleep efficiency 86%.   -PSG 2/21/2014: weight 130 lbs. Mild ANGELICA with AHI 5, REM AHI 8.9, supine AHI 9.1, SpO2 cl 90%      Prior meds tried for insomnia:  -Trazodone up to 200 mg made her feel groggy the next day  -Ambien 5 mg and higher doses were too strong  -Lunesta 3 mg was helpful for awhile, but then she became tolerant  -gabapentin - does not recall how this affected her in summer 2024 (prescribed for pain control). She may not have taken more than a few times because she healed quickly from surgery  -melatonin up to 20 mg - ineffective  -hydroxyzine 50 mg - helpful, 50 mg more helpful for sleep onset, but not maintenance. 25 mg ineffective    Problem List[1]  Medical History[2]  Surgical History[3]  Medications Ordered Prior to Encounter[4]    PHYSICAL EXAMINATION:   Vitals:    06/30/25 1350   BP: 122/80   BP Location: Left  "arm   BP Cuff Size: Small adult   Pulse: 70   SpO2: 97%   Weight: 65.5 kg (144 lb 8 oz)   Height: 1.626 m (5' 4\")     Body mass index is 24.8 kg/m².  General: Awake. Alert. Comfortable. No apparent distress.   Speech: Normal.  Comprehension: Normal.  Mood: Stable.  Affect: Appropriate.  Pul:         Normal respiratory effort.   Abd:         Obese  Neuro: Alert, well-oriented. Cranial nerves II-XII grossly normal and symmetric.  Moves all limbs symmetrically with no evidence of significant focal weakness. No abnormal movements noted. Normal gait      ASSESSMENT AND PLAN: Ms. Lauren Cornell is a 64 y.o. female with chronic insomnia (sleep onset and sleep maintenance insomnia) on gabapentin and hydroxyzine, with RLS/PLMS with history of ANGELICA previously on CPAP (lost 160 lbs from bariatric surgery). She has more nights of better sleep with the combination of gabapentin and hydroxyzine, better than using just hydroxyzine alone. No worsening of her RLS from taking hydroxyzine. She has gained 11 lbs since our last visit, which may be from a combination of gabapentin and decreased physical activity. She has a busy household with grandchildren who disrupt her ability to fall/stay asleep, and it is hard for her to wait until she is drowsy before going to bed because of the grandchildren who lovingly want to interact with her. It is possible that underlying sleep apnea is contributing to her insomnia, which is why she is undergoing sleep testing in a couple of weeks. Note that her insomnia may cause home sleep testing to under-estimate her sleep disordered breathing.    #chronic insomnia  -continue nightly hydroxyzine 25 mg and gabapentin 600 mg. Consider increasing gabapentin if helpful, though pt is reluctant due to possible weight gain from it  -advised avoiding staying in bed when not drowsy  -advised having her granddaughter not sleep in her bed    #ANGELICA - ?still present  -home sleep study scheduled for " 7/15    #RLS  -gabapentin as above  -may be worsened by sleep apnea; sleep testing as above      All of the above was discussed with the patient in detail. She voiced an understanding of the above and was agreeable to proceed further as advised.     43 minutes were spent on this encounter, including time spent with the patient, time spent reviewing the chart, updating the chart as needed, and documenting.     FOLLOW UP: after sleep study         [1]   Patient Active Problem List  Diagnosis    Reyes's esophagus    Chronic pain of both knees    Carpal tunnel syndrome    GERD (gastroesophageal reflux disease)    Insomnia    Migraine headache    OAB (overactive bladder)    Psoriasis    Restless legs syndrome    Vitamin B12 deficiency    Mixed stress and urge urinary incontinence    Health care maintenance    Osteopenia   [2]   Past Medical History:  Diagnosis Date    Abdominal distension (gaseous) 02/04/2020    Abdominal bloating    Acute bronchitis 04/24/2023    Adverse effect of anesthesia     PT STATES TAKES MORE TO PUT UNDER.    Arthritis knee    Arthritis of both knees 04/24/2023    Benzodiazepine causing adverse effect in therapeutic use, subsequent encounter 08/22/2023    Body mass index (BMI) 22.0-22.9, adult 11/02/2021    Body mass index (BMI) of 22.0 to 22.9 in adult    BRCA1 negative 2024    BRCA2 negative ?2024    Carbuncle, unspecified 11/30/2014    Recurrent boils    Cervical cancer April 2024    Contact with and (suspected) exposure to other bacterial communicable diseases 11/25/2014    MRSA exposure    Cutaneous abscess of limb, unspecified 10/08/2014    Abscess of axilla    Dental disease     I need 5 teeth pulled they are rotten    Encounter for screening for infections with a predominantly sexual mode of transmission 11/28/2016    Screening for STD (sexually transmitted disease)    GERD (gastroesophageal reflux disease)     Years    Headache     Hoarseness of voice 04/24/2023    Medial  epicondylitis, unspecified elbow 2015    Medial epicondylitis    Muscle pain 2023    Nausea 10/27/2014    Nausea    Nephrolithiasis 2023    Neuropraxia of median nerve 2023    Nutritional deficiency, unspecified 2014    Nutrition deficiency due to insufficient food    Obstructive sleep apnea syndrome 2023    not current, resolved after gastric bypass surgery with weight loss    Other conditions influencing health status 2016    History of cough    Ovarian cancer (Multi)     Pain of left heel 2023    Personal history of diseases of the skin and subcutaneous tissue 2018    History of impetigo    Personal history of other complications of pregnancy, childbirth and the puerperium     History of spontaneous     Personal history of other diseases of the respiratory system 10/07/2015    History of sinusitis    Personal history of other diseases of the respiratory system 2022    History of acute bronchitis    Personal history of other drug therapy 2016    History of influenza vaccination    Personal history of other infectious and parasitic diseases 2018    History of herpes zoster    Personal history of other infectious and parasitic diseases 2018    History of athlete's foot    Personal history of other medical treatment 10/08/2014    History of screening mammography    Personal history of other medical treatment 10/03/2016    History of screening mammography    Personal history of other specified conditions 2020    History of abdominal pain    Personal history of other specified conditions 10/16/2019    History of dysuria    Personal history of other specified conditions 2020    History of abnormal weight loss    Personal history of other specified conditions 2015    History of diarrhea    Personal history of other specified conditions 2015    History of fatigue    Personal history of other specified  conditions 02/26/2018    History of insomnia    Personal history of urinary calculi 10/16/2019    History of renal calculi    Personal history of urinary calculi     History of kidney stones    Psoriasis 04/24/2023    Urinary tract infection, site not specified 06/22/2015    UTI (lower urinary tract infection)   [3]   Past Surgical History:  Procedure Laterality Date    AUGMENTATION MAMMAPLASTY  5    BREAST SURGERY  10/08/2014    Breast Surgery    CHOLECYSTECTOMY      COSMETIC SURGERY      GALLBLADDER SURGERY  10/08/2014    Gallbladder Surgery    HYSTERECTOMY  08/01/2024    NOSE SURGERY  01/08/2016    septoplasty    SINUS SURGERY      STOMACH SURGERY  10/08/2014    Gastric Surgery   [4]   Current Outpatient Medications on File Prior to Visit   Medication Sig Dispense Refill    acetaminophen (Tylenol) 325 mg tablet Take 3 tablets (975 mg) by mouth every 6 hours. (Patient taking differently: Take 1,000 mg by mouth if needed.) 60 tablet 0    albuterol 90 mcg/actuation inhaler Inhale 1 puff every 4 hours if needed.      CHONDROITIN SULFATE A ORAL Take 1,200 mg by mouth.      docusate sodium (Colace) 100 mg capsule Take 1 capsule (100 mg) by mouth 2 times a day as needed for constipation. 30 capsule 5    famotidine (Pepcid) 40 mg tablet TAKE 1 TABLET (40 MG) BY MOUTH ONCE DAILY AT BEDTIME. 90 tablet 1    gabapentin (Neurontin) 300 mg capsule Take 1 capsule (300 mg) by mouth as needed at bedtime (sleep). (Patient taking differently: Take 1-2 capsules (300-600 mg) by mouth as needed at bedtime (sleep).) 60 capsule 0    glucosamine sulfate (Glucosamine) 500 mg tablet Take 1,500 mg by mouth.      hydrOXYzine HCL (Atarax) 25 mg tablet Take 1 tablet (25 mg) by mouth as needed at bedtime (sleep). 90 tablet 0    ibuprofen 600 mg tablet Take 1 tablet (600 mg) by mouth every 6 hours if needed for mild pain (1 - 3). 60 tablet 0    ondansetron ODT (Zofran-ODT) 4 mg disintegrating tablet Take 1 tablet (4 mg) by mouth every 8 hours if  needed for nausea or vomiting (migraine related N/V). 9 tablet 11    oxybutynin XL (Ditropan-XL) 15 mg 24 hr tablet TAKE 1 TABLET BY MOUTH EVERY DAY 90 tablet 3    pantoprazole (ProtoNix) 40 mg EC tablet Take 1 tablet (40 mg) by mouth 2 times a day. 180 tablet 3    rimegepant (Nurtec ODT) 75 mg tablet,disintegrating Dissolve 1 tablet (75 mg) in the mouth once daily as needed (Take one tablet as needed for migraine. No repeat dosing. Do not take more than 18 doses in 30 days). 9 tablet 11    hylan (Synvisc-One) 48 mg/6 mL injection Inject one syringe (48mg/6mL) into each knee one time. 12 mL 0    oxyCODONE (Roxicodone) 5 mg immediate release tablet Take 1 tablet (5 mg) by mouth. (Patient not taking: Reported on 6/30/2025)      oxyCODONE (Roxicodone) 5 mg immediate release tablet Take 1 tablet (5 mg) by mouth every 6 hours if needed. (Patient not taking: Reported on 6/30/2025)      simethicone (MYLICON ORAL) Mylicon (Patient not taking: Reported on 6/30/2025)      [DISCONTINUED] amoxicillin-clavulanate (Augmentin) 875-125 mg tablet Take 1 tablet by mouth every 12 hours. (Patient not taking: Reported on 6/30/2025)      [DISCONTINUED] chlorhexidine (Peridex) 0.12 % solution RINSE MOUTH WITH 15ML (1 CAPFUL) FOR 30 SECONDS IN MORNING AND EVENING AFTER BRUSHING, THEN SPIT (Patient not taking: Reported on 6/30/2025)      [DISCONTINUED] RANITIDINE HCL ORAL Take by mouth. (Patient not taking: Reported on 6/30/2025)      [DISCONTINUED] sulfamethoxazole-trimethoprim (Bactrim DS) 800-160 mg tablet every 12 hours. (Patient not taking: Reported on 6/30/2025)       No current facility-administered medications on file prior to visit.

## 2025-07-11 DIAGNOSIS — G47.61 PERIODIC LIMB MOVEMENTS OF SLEEP: ICD-10-CM

## 2025-07-11 DIAGNOSIS — F51.04 CHRONIC INSOMNIA: ICD-10-CM

## 2025-07-11 RX ORDER — GABAPENTIN 300 MG/1
300-600 CAPSULE ORAL NIGHTLY PRN
Qty: 60 CAPSULE | Refills: 2 | Status: SHIPPED | OUTPATIENT
Start: 2025-07-11

## 2025-07-15 ENCOUNTER — CLINICAL SUPPORT (OUTPATIENT)
Dept: SLEEP MEDICINE | Facility: CLINIC | Age: 65
End: 2025-07-15
Payer: COMMERCIAL

## 2025-07-15 VITALS — SYSTOLIC BLOOD PRESSURE: 113 MMHG | DIASTOLIC BLOOD PRESSURE: 75 MMHG | HEART RATE: 105 BPM

## 2025-07-15 DIAGNOSIS — G47.33 OSA (OBSTRUCTIVE SLEEP APNEA): ICD-10-CM

## 2025-07-15 NOTE — PROGRESS NOTES
Type of Study: HOME SLEEP STUDY- NOMAD    The patient received equipment and instructions for use of the [employer] Nomad HSAT device. The patient was instructed how to apply the effort belts, cannula, thermistor. It was also explained how the Nomad and oximeter components work.  The patient was asked to record their sleep for an 8-hour period.    The patient was informed of their responsibility for the device and acknowledged this by signing the HSAT device contract. The patient was asked to return the device on 07/16/2025 to [facility].    The patient was instructed to call 911 as usual for any medical- emergencies while at home.  The patient was also given a phone number for troubleshooting when using the device in case there were additional questions.      Antoinette Cunningham, RRT-SDS

## 2025-07-22 ENCOUNTER — APPOINTMENT (OUTPATIENT)
Dept: PODIATRY | Facility: CLINIC | Age: 65
End: 2025-07-22
Payer: COMMERCIAL

## 2025-07-22 DIAGNOSIS — M77.42 METATARSALGIA OF LEFT FOOT: ICD-10-CM

## 2025-07-22 DIAGNOSIS — M79.672 LEFT FOOT PAIN: Primary | ICD-10-CM

## 2025-07-22 PROCEDURE — 99202 OFFICE O/P NEW SF 15 MIN: CPT | Performed by: PODIATRIST

## 2025-07-22 NOTE — PROGRESS NOTES
History of Present Illness:   Patient states they are here for foot pain  Most bothersome when walking  Denies trauma to area  Hurts throughout day - when wearing certain shoes and a shoe with a minimal heel  Denies being DM  No improvement noted  Looking for relief  NO other pedal complaints at this time      Past Medical History  Medical History[1]    Medications and Allergies have been reviewed.    Review Of Systems:  GENERAL: No weight loss, malaise or fevers.  HEENT: Negative for frequent or significant headaches,   RESPIRATORY: Negative for cough, wheezing or shortness of breath.  CARDIOVASCULAR: Negative for chest pain, leg swelling or palpitations.    Examination of Both Lower Extremities:   Objective:   Vasc: DP and PT pulses are palpable bilateral.  CFT is less than 3 seconds bilateral.  Skin temperature is warm to cool proximal to distal bilateral.      Neuro: Vibratory, light touch and proprioception are intact bilateral.     Derm: Nails 1-5 bilateral are intact.      Ortho: Muscle strength is 5/5 for all pedal groups tested.  Pain to 2nd met head with palpation and Rom. No edema, erythema or ecchymosis noted    1. Left foot pain        2. Metatarsalgia of left foot          Patient exam and eval  Discussed arthritic changes in feet  Discussed causes, symptoms, aggravating factors and treatment options  Discussed radiographs - deferred  Recommend stiff supportive shoe gear  Shoes that do not twist or bend  Shoes with thick sole will absorb shock the best  Minimize walking barefoot/flat sandals  Discussed 80/20 rule  Discussed ice, nsaids, candida land/biofreeze  Patient to follow up if no improvement noted.   Pt in agreement to plan  All questions answered          Melissa Duff DPM  [phone]  Option 2  Fax: [phone]         [1]   Past Medical History:  Diagnosis Date    Abdominal distension (gaseous) 02/04/2020    Abdominal bloating    Acute bronchitis 04/24/2023    Adverse effect of anesthesia      PT STATES TAKES MORE TO PUT UNDER.    Arthritis knee    Arthritis of both knees 2023    Benzodiazepine causing adverse effect in therapeutic use, subsequent encounter 2023    Body mass index (BMI) 22.0-22.9, adult 2021    Body mass index (BMI) of 22.0 to 22.9 in adult    BRCA1 negative     BRCA2 negative ?    Carbuncle, unspecified 2014    Recurrent boils    Cervical cancer 2024    Contact with and (suspected) exposure to other bacterial communicable diseases 2014    MRSA exposure    Cutaneous abscess of limb, unspecified 10/08/2014    Abscess of axilla    Dental disease     I need 5 teeth pulled they are rotten    Encounter for screening for infections with a predominantly sexual mode of transmission 2016    Screening for STD (sexually transmitted disease)    GERD (gastroesophageal reflux disease)     Years    Headache     Hoarseness of voice 2023    Medial epicondylitis, unspecified elbow 2015    Medial epicondylitis    Muscle pain 2023    Nausea 10/27/2014    Nausea    Nephrolithiasis 2023    Neuropraxia of median nerve 2023    Nutritional deficiency, unspecified 2014    Nutrition deficiency due to insufficient food    Obstructive sleep apnea syndrome 2023    not current, resolved after gastric bypass surgery with weight loss    Other conditions influencing health status 2016    History of cough    Ovarian cancer (Multi)     Pain of left heel 2023    Personal history of diseases of the skin and subcutaneous tissue 2018    History of impetigo    Personal history of other complications of pregnancy, childbirth and the puerperium     History of spontaneous     Personal history of other diseases of the respiratory system 10/07/2015    History of sinusitis    Personal history of other diseases of the respiratory system 2022    History of acute bronchitis    Personal history of other drug  therapy 11/28/2016    History of influenza vaccination    Personal history of other infectious and parasitic diseases 07/18/2018    History of herpes zoster    Personal history of other infectious and parasitic diseases 02/26/2018    History of athlete's foot    Personal history of other medical treatment 10/08/2014    History of screening mammography    Personal history of other medical treatment 10/03/2016    History of screening mammography    Personal history of other specified conditions 02/04/2020    History of abdominal pain    Personal history of other specified conditions 10/16/2019    History of dysuria    Personal history of other specified conditions 08/03/2020    History of abnormal weight loss    Personal history of other specified conditions 06/19/2015    History of diarrhea    Personal history of other specified conditions 06/09/2015    History of fatigue    Personal history of other specified conditions 02/26/2018    History of insomnia    Personal history of urinary calculi 10/16/2019    History of renal calculi    Personal history of urinary calculi     History of kidney stones    Psoriasis 04/24/2023    Urinary tract infection, site not specified 06/22/2015    UTI (lower urinary tract infection)

## 2025-08-11 ENCOUNTER — APPOINTMENT (OUTPATIENT)
Dept: PRIMARY CARE | Facility: CLINIC | Age: 65
End: 2025-08-11
Payer: COMMERCIAL

## 2025-08-11 VITALS
BODY MASS INDEX: 24.45 KG/M2 | TEMPERATURE: 97.5 F | HEIGHT: 64 IN | WEIGHT: 143.2 LBS | SYSTOLIC BLOOD PRESSURE: 131 MMHG | DIASTOLIC BLOOD PRESSURE: 82 MMHG | OXYGEN SATURATION: 100 % | HEART RATE: 70 BPM

## 2025-08-11 DIAGNOSIS — F51.04 CHRONIC INSOMNIA: ICD-10-CM

## 2025-08-11 DIAGNOSIS — D72.819 LEUKOPENIA, UNSPECIFIED TYPE: ICD-10-CM

## 2025-08-11 DIAGNOSIS — R41.840 ATTENTION AND CONCENTRATION DEFICIT: ICD-10-CM

## 2025-08-11 DIAGNOSIS — G47.00 INSOMNIA, UNSPECIFIED TYPE: Primary | ICD-10-CM

## 2025-08-11 PROCEDURE — 3008F BODY MASS INDEX DOCD: CPT | Performed by: PHYSICIAN ASSISTANT

## 2025-08-11 PROCEDURE — 99214 OFFICE O/P EST MOD 30 MIN: CPT | Performed by: PHYSICIAN ASSISTANT

## 2025-08-11 RX ORDER — HYDROXYZINE HYDROCHLORIDE 25 MG/1
25 TABLET, FILM COATED ORAL NIGHTLY PRN
Qty: 90 TABLET | Refills: 1 | Status: SHIPPED | OUTPATIENT
Start: 2025-08-11

## 2025-09-05 DIAGNOSIS — K21.9 GASTROESOPHAGEAL REFLUX DISEASE, UNSPECIFIED WHETHER ESOPHAGITIS PRESENT: ICD-10-CM

## 2025-09-05 RX ORDER — PANTOPRAZOLE SODIUM 40 MG/1
40 TABLET, DELAYED RELEASE ORAL DAILY
Qty: 90 TABLET | Refills: 1 | Status: SHIPPED | OUTPATIENT
Start: 2025-09-05

## 2025-09-24 ENCOUNTER — APPOINTMENT (OUTPATIENT)
Dept: SLEEP MEDICINE | Facility: CLINIC | Age: 65
End: 2025-09-24
Payer: COMMERCIAL

## 2025-11-19 ENCOUNTER — APPOINTMENT (OUTPATIENT)
Dept: BEHAVIORAL HEALTH | Facility: CLINIC | Age: 65
End: 2025-11-19
Payer: COMMERCIAL

## 2026-02-12 ENCOUNTER — APPOINTMENT (OUTPATIENT)
Dept: PRIMARY CARE | Facility: CLINIC | Age: 66
End: 2026-02-12
Payer: COMMERCIAL

## (undated) DEVICE — TROCAR, KII OPTICAL BLADELESS 5MM Z THREAD 100MM LNGTH

## (undated) DEVICE — SUTURE, VICRYL, 0, 3 X 27 IN, CT-1, VIOLET

## (undated) DEVICE — POSITIONING SYSTEM, PAGAZZI, PATIENT

## (undated) DEVICE — Device

## (undated) DEVICE — TROCAR, BLUNT, CANNULA THREADED, 11MM X 6CM, INCL

## (undated) DEVICE — PREP TRAY, VAGINAL

## (undated) DEVICE — ACCESS SYS, KII OPTICAL, Z-THREAD, 11X100CM

## (undated) DEVICE — TIP, SUCTION, YANKAUER, FLEXIBLE

## (undated) DEVICE — SOLUTION, IRRIGATION, X RX SODIUM CHL 0.9%, 1000ML BTL

## (undated) DEVICE — SUTURE, MONOCRYL, 4-0, 27 IN, PS-2, UNDYED

## (undated) DEVICE — TUBING, SUCTION, 9 FT, STERILE, CLEAR

## (undated) DEVICE — HEMOSTATIC, MATRIX, SURGIFLO, 8ML

## (undated) DEVICE — THUNDERBEAT, 5MM, 35CM, FRONT ACTUATED

## (undated) DEVICE — MANIPULATOR, ADVINCULA DELINEATOR, 3.0CM

## (undated) DEVICE — SUTURE, VICRYL, 3-0, 27 IN, SH

## (undated) DEVICE — TUBE SET, PNEUMOCLEAR, SMOKE EVACU, HIGH-FLOW

## (undated) DEVICE — TISSUE ADHESIVE, EXOFIN, 1ML

## (undated) DEVICE — CATHETER TRAY, FOLEY, ALOETOUCH, 16FR, 10ML, W/ DRAINBAG

## (undated) DEVICE — SYRINGE, 60 CC, IRRIGATION, BULB, CONTRO-BULB, PAPER POUCH

## (undated) DEVICE — INSUFFLATION TUBING, HIGH FLOW, W/FILTER

## (undated) DEVICE — SLEEVE, VASO PRESS, CALF GARMENT, MEDIUM, GREEN

## (undated) DEVICE — GLOVE, SURGICAL, PROTEXIS PI , 6.5, PF, LF

## (undated) DEVICE — IRRIGATOR, WOUND, HYDRO SURG PLUS, W/O TIP, DISP